# Patient Record
Sex: FEMALE | Race: WHITE | NOT HISPANIC OR LATINO | Employment: OTHER | URBAN - METROPOLITAN AREA
[De-identification: names, ages, dates, MRNs, and addresses within clinical notes are randomized per-mention and may not be internally consistent; named-entity substitution may affect disease eponyms.]

---

## 2018-04-17 ENCOUNTER — OFFICE VISIT (OUTPATIENT)
Dept: URGENT CARE | Facility: MEDICAL CENTER | Age: 58
End: 2018-04-17
Payer: COMMERCIAL

## 2018-04-17 ENCOUNTER — APPOINTMENT (OUTPATIENT)
Dept: RADIOLOGY | Facility: MEDICAL CENTER | Age: 58
End: 2018-04-17
Payer: COMMERCIAL

## 2018-04-17 VITALS
DIASTOLIC BLOOD PRESSURE: 88 MMHG | TEMPERATURE: 98 F | HEIGHT: 63 IN | BODY MASS INDEX: 23.25 KG/M2 | WEIGHT: 131.25 LBS | SYSTOLIC BLOOD PRESSURE: 144 MMHG | HEART RATE: 60 BPM | RESPIRATION RATE: 20 BRPM

## 2018-04-17 DIAGNOSIS — M25.562 ACUTE PAIN OF LEFT KNEE: ICD-10-CM

## 2018-04-17 DIAGNOSIS — M25.562 ACUTE PAIN OF LEFT KNEE: Primary | ICD-10-CM

## 2018-04-17 PROCEDURE — 99283 EMERGENCY DEPT VISIT LOW MDM: CPT | Performed by: PHYSICIAN ASSISTANT

## 2018-04-17 PROCEDURE — G0382 LEV 3 HOSP TYPE B ED VISIT: HCPCS | Performed by: PHYSICIAN ASSISTANT

## 2018-04-17 PROCEDURE — 73564 X-RAY EXAM KNEE 4 OR MORE: CPT

## 2018-04-17 RX ORDER — CETIRIZINE HYDROCHLORIDE 10 MG/1
10 TABLET ORAL DAILY
COMMUNITY
End: 2022-03-14 | Stop reason: CLARIF

## 2018-04-17 RX ORDER — ALBUTEROL SULFATE 2.5 MG/3ML
2.5 SOLUTION RESPIRATORY (INHALATION) EVERY 6 HOURS PRN
COMMUNITY

## 2018-04-17 NOTE — PROGRESS NOTES
Left knee pain for past few weeks and pain getting worse  especially going up and down stairs  Feels a little off balance

## 2018-04-17 NOTE — PROGRESS NOTES
[facility]        NAME: Jing Johnston is a 59 y o  female  : 1960    MRN: [id number]  DATE: 2018  TIME: 3:31 PM    Assessment and Plan   Acute pain of left knee [M25 562]  1  Acute pain of left knee  XR knee 4+ vw left injury         Patient Instructions       Follow up with PCP in 3-5 days  Proceed to  ER if symptoms worsen  Chief Complaint     Chief Complaint   Patient presents with    Knee Pain     for weeks          History of Present Illness       This is a 58 y/o F here for left knee pain x 2 years  Pt reports initially she fell onto her knee while pushing a car up an aluminum ramp in the rain  Pt reports then about a year ago dog ran out of the house and she twisted her knee  Pt reports she has not been seen for her knee pain in past  Pt reports she came in today because the pain has been worsening the past  Couple of days  Pain is located on lateral aspect of knee  Pain is worse when going up and down steps  Pt reports knee giving out sensation when going up and down steps and has to hold on to the railing  Pt denies taking any OTC medications  Knee Pain          Review of Systems   Review of Systems   Constitutional: Negative for chills and fever  HENT: Negative  Eyes: Negative  Respiratory: Negative for chest tightness, shortness of breath and wheezing  Cardiovascular: Negative for chest pain and palpitations  Musculoskeletal: Positive for arthralgias  Negative for back pain and joint swelling  Skin: Negative for rash           Current Medications       Current Outpatient Prescriptions:     albuterol (2 5 mg/3 mL) 0 083 % nebulizer solution, Take 2 5 mg by nebulization every 6 (six) hours as needed for wheezing, Disp: , Rfl:     cetirizine (ZyrTEC) 10 mg tablet, Take 10 mg by mouth daily, Disp: , Rfl:     fluticasone-salmeterol (ADVAIR HFA) 115-21 MCG/ACT inhaler, Inhale 2 puffs 2 (two) times a day, Disp: , Rfl:     Current Allergies     Allergies as of 04/17/2018    (No Known Allergies)            The following portions of the patient's history were reviewed and updated as appropriate: allergies, current medications, past family history, past medical history, past social history, past surgical history and problem list      Past Medical History:   Diagnosis Date    Stroke [facility])        History reviewed  No pertinent surgical history  No family history on file  Medications have been verified  Objective   /88   Pulse 60   Temp 98 °F (36 7 °C) (Temporal)   Resp 20   Ht 5' 3" (1 6 m)   Wt 59 5 kg (131 lb 4 oz)   BMI 23 25 kg/m²     X-RAY  Left knee  I personally reviewed X-ray  No acute osseous abnormalities  Physical Exam     Physical Exam   Constitutional: She is oriented to person, place, and time  She appears well-developed and well-nourished  No distress  Cardiovascular: Normal rate, regular rhythm and normal heart sounds  Pulmonary/Chest: Effort normal and breath sounds normal  No respiratory distress  Musculoskeletal: She exhibits tenderness  Left knee: She exhibits normal range of motion, no swelling, no effusion, no ecchymosis, no deformity, no erythema, no LCL laxity, normal meniscus and no MCL laxity  Tenderness found  Lateral joint line tenderness noted  Negative lachman's test, pain with valgus stress   Neurological: She is alert and oriented to person, place, and time  Skin: No rash noted  Nursing note and vitals reviewed

## 2019-09-30 ENCOUNTER — OFFICE VISIT (OUTPATIENT)
Dept: INTERNAL MEDICINE CLINIC | Age: 59
End: 2019-09-30
Payer: COMMERCIAL

## 2019-09-30 VITALS
DIASTOLIC BLOOD PRESSURE: 78 MMHG | WEIGHT: 126.8 LBS | HEART RATE: 52 BPM | TEMPERATURE: 97.2 F | OXYGEN SATURATION: 98 % | SYSTOLIC BLOOD PRESSURE: 144 MMHG | HEIGHT: 62 IN | BODY MASS INDEX: 23.34 KG/M2

## 2019-09-30 DIAGNOSIS — Z12.39 SCREENING FOR MALIGNANT NEOPLASM OF BREAST: ICD-10-CM

## 2019-09-30 DIAGNOSIS — Z11.59 NEED FOR HEPATITIS C SCREENING TEST: Primary | ICD-10-CM

## 2019-09-30 DIAGNOSIS — Z12.11 SCREENING FOR MALIGNANT NEOPLASM OF COLON: ICD-10-CM

## 2019-09-30 DIAGNOSIS — Z23 NEED FOR INFLUENZA VACCINATION: ICD-10-CM

## 2019-09-30 DIAGNOSIS — J45.909 UNCOMPLICATED ASTHMA, UNSPECIFIED ASTHMA SEVERITY, UNSPECIFIED WHETHER PERSISTENT: ICD-10-CM

## 2019-09-30 DIAGNOSIS — Z13.220 SCREENING CHOLESTEROL LEVEL: ICD-10-CM

## 2019-09-30 DIAGNOSIS — J45.30 MILD PERSISTENT ASTHMA WITHOUT COMPLICATION: ICD-10-CM

## 2019-09-30 DIAGNOSIS — Z12.11 SCREENING FOR COLON CANCER: ICD-10-CM

## 2019-09-30 DIAGNOSIS — R00.1 BRADYCARDIA: ICD-10-CM

## 2019-09-30 PROCEDURE — 90686 IIV4 VACC NO PRSV 0.5 ML IM: CPT | Performed by: INTERNAL MEDICINE

## 2019-09-30 PROCEDURE — 99202 OFFICE O/P NEW SF 15 MIN: CPT | Performed by: INTERNAL MEDICINE

## 2019-09-30 PROCEDURE — 90471 IMMUNIZATION ADMIN: CPT | Performed by: INTERNAL MEDICINE

## 2019-09-30 RX ORDER — VITAMIN B COMPLEX
1 TABLET ORAL DAILY
COMMUNITY

## 2019-09-30 RX ORDER — TRIAMCINOLONE ACETONIDE 55 UG/1
2 SPRAY, METERED NASAL DAILY
Qty: 1 BOTTLE | Refills: 3 | Status: SHIPPED | OUTPATIENT
Start: 2019-09-30 | End: 2020-03-30 | Stop reason: SDUPTHER

## 2019-09-30 RX ORDER — ALBUTEROL SULFATE 90 UG/1
2 AEROSOL, METERED RESPIRATORY (INHALATION) 4 TIMES DAILY
Qty: 3 EACH | Refills: 1 | Status: SHIPPED | OUTPATIENT
Start: 2019-09-30 | End: 2020-03-30 | Stop reason: SDUPTHER

## 2019-09-30 RX ORDER — ALBUTEROL SULFATE 90 UG/1
2 AEROSOL, METERED RESPIRATORY (INHALATION) 4 TIMES DAILY
COMMUNITY
End: 2019-09-30 | Stop reason: SDUPTHER

## 2019-09-30 NOTE — ASSESSMENT & PLAN NOTE
Screening lipid panel  Healthy diet and exercise  Cologuard   Pt not interested in colonoscopy  Recommend screening PAP

## 2019-09-30 NOTE — PROGRESS NOTES
Assessment/Plan:    Mild persistent asthma  Resume advair daily  Use albuterol as needed  Cont zyrtec  Flu shot given today  UTD with pneumovax  Avoid triggers    Screening for colon cancer  Screening lipid panel  Healthy diet and exercise  Cologuard  Pt not interested in colonoscopy  Recommend screening PAP    Bradycardia  Pt states her heart rate has always been low  Asymptomatic  TSH       Diagnoses and all orders for this visit:    Need for hepatitis C screening test  -     Hepatitis C antibody; Future    Screening for malignant neoplasm of breast  -     Mammo screening bilateral w cad; Future    Screening for malignant neoplasm of colon  -     Ambulatory referral to Gastroenterology; Future    Uncomplicated asthma, unspecified asthma severity, unspecified whether persistent  -     albuterol (PROVENTIL HFA,VENTOLIN HFA) 90 mcg/act inhaler; Inhale 2 puffs 4 (four) times a day  -     fluticasone-salmeterol (ADVAIR, WIXELA) 100-50 mcg/dose inhaler; Inhale 1 puff 2 (two) times a day Rinse mouth after use  Screening for colon cancer    Bradycardia    Mild persistent asthma without complication    Other orders  -     fluticasone-salmeterol (ADVAIR, WIXELA) 100-50 mcg/dose inhaler; Inhale 1 puff 2 (two) times a day Rinse mouth after use  -     Vitamin Mixture (VITAMIN E COMPLETE PO); Take by mouth 267 mg  one pill per day  -     Coenzyme Q10 (COQ10) 100 MG CAPS; Take 1 capsule by mouth daily  -     albuterol (PROVENTIL HFA,VENTOLIN HFA) 90 mcg/act inhaler; Inhale 2 puffs 4 (four) times a day          Subjective:          Patient ID: Gerson Prado is a 62 y o  female  She is switching providers and ran out of medication about 1 month ago  She is UTD with pneumovax and Tdap  Discussed flu shot today  Asthma   She complains of frequent throat clearing  There is no chest tightness, cough, difficulty breathing, hemoptysis, hoarse voice, shortness of breath, sputum production or wheezing   This is a chronic problem  The current episode started more than 1 year ago (dx in childhood)  The problem occurs intermittently  The problem has been unchanged  Pertinent negatives include no appetite change, chest pain, dyspnea on exertion, ear congestion, ear pain, fever, headaches, malaise/fatigue, nasal congestion, postnasal drip, rhinorrhea, sneezing or sore throat  Her symptoms are aggravated by animal exposure, change in weather and pollen  Her symptoms are alleviated by steroid inhaler  She reports significant improvement on treatment  Risk factors for lung disease include animal exposure  Her past medical history is significant for asthma  The following portions of the patient's history were reviewed and updated as appropriate: past family history, past surgical history and problem list     Review of Systems   Constitutional: Negative for appetite change, chills, fatigue, fever and malaise/fatigue  HENT: Negative for congestion, ear pain, hoarse voice, postnasal drip, rhinorrhea, sneezing and sore throat  Eyes: Negative for visual disturbance  Respiratory: Negative for cough, hemoptysis, sputum production, shortness of breath and wheezing  Cardiovascular: Negative for chest pain, dyspnea on exertion, palpitations and leg swelling  Gastrointestinal: Negative for abdominal pain, constipation, diarrhea, nausea and vomiting  Neurological: Negative for dizziness and headaches  Psychiatric/Behavioral: Negative for dysphoric mood  The patient is not nervous/anxious            Past Medical History:   Diagnosis Date    Asthma     Stroke [facility])          Current Outpatient Medications:     albuterol (PROVENTIL HFA,VENTOLIN HFA) 90 mcg/act inhaler, Inhale 2 puffs 4 (four) times a day, Disp: , Rfl:     cetirizine (ZyrTEC) 10 mg tablet, Take 10 mg by mouth daily, Disp: , Rfl:     Coenzyme Q10 (COQ10) 100 MG CAPS, Take 1 capsule by mouth daily, Disp: , Rfl:     fluticasone-salmeterol (ADVAIR, WIXELA) 100-50 mcg/dose inhaler, Inhale 1 puff 2 (two) times a day Rinse mouth after use , Disp: , Rfl:     Vitamin Mixture (VITAMIN E COMPLETE PO), Take by mouth 267 mg  one pill per day, Disp: , Rfl:     albuterol (2 5 mg/3 mL) 0 083 % nebulizer solution, Take 2 5 mg by nebulization every 6 (six) hours as needed for wheezing, Disp: , Rfl:     fluticasone-salmeterol (ADVAIR HFA) 115-21 MCG/ACT inhaler, Inhale 2 puffs 2 (two) times a day, Disp: , Rfl:     Allergies   Allergen Reactions    Mayonnaise Hives    Mold Extract [Trichophyton] Other (See Comments)     Asthma      Other Eye Swelling     Dogs, cats, leaves - pt has asthma    Dust Mite Extract Cough     asthma       Social History   History reviewed  No pertinent surgical history  Family History   Problem Relation Age of Onset    Cancer Father     Cancer Brother        Health Maintenance   Topic Date Due    Hepatitis C Screening  1960    MAMMOGRAM  1960    CRC Screening: Colonoscopy  1960    INFLUENZA VACCINE  07/01/2019    Depression Screening PHQ  09/30/2020    BMI: Adult  09/30/2020    Pneumococcal Vaccine: 65+ Years (1 of 2 - PCV13) 05/08/2023    DTaP,Tdap,and Td Vaccines (2 - Td) 04/18/2026    Pneumococcal Vaccine: Pediatrics (0 to 5 Years) and At-Risk Patients (6 to 59 Years)  Aged Out    HEPATITIS B VACCINES  Aged Out       There is no immunization history on file for this patient  Objective:  /78 (BP Location: Right arm, Patient Position: Sitting, Cuff Size: Standard)   Pulse (!) 52   Temp (!) 97 2 °F (36 2 °C) (Tympanic)   Ht 5' 1 81" (1 57 m) Comment: shoes off  Wt 57 5 kg (126 lb 12 8 oz) Comment: shoes off  SpO2 98%   BMI 23 33 kg/m²   Body mass index is 23 33 kg/m²  Physical Exam   Constitutional: She appears well-developed and well-nourished  HENT:   Head: Normocephalic and atraumatic     Right Ear: Tympanic membrane, external ear and ear canal normal    Left Ear: Tympanic membrane, external ear and ear canal normal    Nose: Nose normal    Mouth/Throat: Oropharynx is clear and moist    Eyes: Pupils are equal, round, and reactive to light  Conjunctivae are normal    Neck: Normal range of motion  Neck supple  Cardiovascular: Regular rhythm and normal heart sounds  Mild bradycardia   Pulmonary/Chest: Effort normal and breath sounds normal    Neurological: She is alert  Skin: Skin is warm and dry  Psychiatric: She has a normal mood and affect

## 2019-09-30 NOTE — PATIENT INSTRUCTIONS
Mild persistent asthma  Resume advair daily  Use albuterol as needed  Cont zyrtec  Flu shot given today  UTD with pneumovax  Avoid triggers    Screening for colon cancer  Screening lipid panel  Healthy diet and exercise  Cologuard   Pt not interested in colonoscopy  Recommend screening PAP    Bradycardia  Pt states her heart rate has always been low  Asymptomatic  TSH

## 2019-09-30 NOTE — ASSESSMENT & PLAN NOTE
Resume advair daily    Use albuterol as needed  Cont zyrtec  Flu shot given today  UTD with pneumovax  Avoid triggers

## 2020-03-30 ENCOUNTER — TELEMEDICINE (OUTPATIENT)
Dept: INTERNAL MEDICINE CLINIC | Age: 60
End: 2020-03-30
Payer: COMMERCIAL

## 2020-03-30 DIAGNOSIS — Z12.11 SCREENING FOR COLON CANCER: Primary | ICD-10-CM

## 2020-03-30 DIAGNOSIS — J45.909 UNCOMPLICATED ASTHMA, UNSPECIFIED ASTHMA SEVERITY, UNSPECIFIED WHETHER PERSISTENT: ICD-10-CM

## 2020-03-30 DIAGNOSIS — J45.30 MILD PERSISTENT ASTHMA WITHOUT COMPLICATION: ICD-10-CM

## 2020-03-30 PROCEDURE — G2012 BRIEF CHECK IN BY MD/QHP: HCPCS | Performed by: INTERNAL MEDICINE

## 2020-03-30 RX ORDER — TRIAMCINOLONE ACETONIDE 55 UG/1
2 SPRAY, METERED NASAL DAILY
Qty: 1 BOTTLE | Refills: 3 | Status: SHIPPED | OUTPATIENT
Start: 2020-03-30 | End: 2022-03-14 | Stop reason: ALTCHOICE

## 2020-03-30 RX ORDER — ALBUTEROL SULFATE 90 UG/1
2 AEROSOL, METERED RESPIRATORY (INHALATION) 4 TIMES DAILY
Qty: 3 EACH | Refills: 1 | Status: SHIPPED | OUTPATIENT
Start: 2020-03-30 | End: 2020-09-21 | Stop reason: SDUPTHER

## 2020-03-30 NOTE — ASSESSMENT & PLAN NOTE
Worsening control due to spring allergies  Increase advair to 250/50 BID during the spring  May decrease to 100/50 in the summer if she is feeling better  Cont albuterol as needed  Cont zyrtec and triamcinolone nasal spray  Try to avoid allergens  COVID-19 social distancing precautions encouraged and reviewed  Pt will call if any change in symptoms  f/u in 6 months

## 2020-03-30 NOTE — PROGRESS NOTES
Virtual Brief Visit    Problem List Items Addressed This Visit        Respiratory    Mild persistent asthma     Worsening control due to spring allergies  Increase advair to 250/50 BID during the spring  May decrease to 100/50 in the summer if she is feeling better  Cont albuterol as needed  Cont zyrtec and triamcinolone nasal spray  Try to avoid allergens  COVID-19 social distancing precautions encouraged and reviewed  Pt will call if any change in symptoms  f/u in 6 months             Relevant Medications    Triamcinolone Acetonide 55 MCG/ACT AERO    albuterol (PROVENTIL HFA,VENTOLIN HFA) 90 mcg/act inhaler    fluticasone-salmeterol (ADVAIR, WIXELA) 250-50 mcg/dose inhaler       Other    Screening for colon cancer - Primary     discussed cologuard  Rx sent         Relevant Orders    Cologuard      Other Visit Diagnoses     Uncomplicated asthma, unspecified asthma severity, unspecified whether persistent        Relevant Medications    albuterol (PROVENTIL HFA,VENTOLIN HFA) 90 mcg/act inhaler    fluticasone-salmeterol (ADVAIR, [place] 250-50 mcg/dose inhaler                Reason for visit is follow up of mild persistent asthma  Encounter provider Lillie Aguillon PA-C    Provider located at [address] [place] [address]      Recent Visits  No visits were found meeting these conditions  Showing recent visits within past 7 days and meeting all other requirements     Today's Visits  Date Type Provider Dept   03/30/20 [facility]   Showing today's visits and meeting all other requirements     Future Appointments  No visits were found meeting these conditions  Showing future appointments within next 150 days and meeting all other requirements        After connecting through telephone and patient was informed that this is not a secure, HIPAA-complaint platform   she agrees to proceed  , the patient was identified by name and date of birth  Jennifer Peralta was informed that this is a telemedicine visit and that the visit is being conducted through telephone and patient was informed that this is not a secure, HIPAA-complaint platform  she agrees to proceed     My office door was closed  No one else was in the room  She acknowledged consent and understanding of privacy and security of the video platform  The patient has agreed to participate and understands they can discontinue the visit at any time  Patient is aware this is a billable service  Subjective  Jennifer Peralta is a 62 y o  female with PMH of mild persistent asthma  She feels she is doing well but does need refills  The spring is the worst time for her asthma  She is using advair BID and nasal steroid along with zyrtec daily  She is using albuterol BID lately due to increased seasonal allergies  She has increased PND and rhinorrhea  She gets relief once she uses her inhaler  She is needing to wash her dog 2 times per week and that is a trigger for her and needs to use albuterol when she does that  She is trying to exercise more  She is concerned about COVID-19 exposure as she is still working  She works as a  but is outdoors and is able to work independently  She is also taking care of her elderly her mother  Past Medical History:   Diagnosis Date    Asthma     Stroke [facility])        History reviewed  No pertinent surgical history      Current Outpatient Medications   Medication Sig Dispense Refill    albuterol (2 5 mg/3 mL) 0 083 % nebulizer solution Take 2 5 mg by nebulization every 6 (six) hours as needed for wheezing      cetirizine (ZyrTEC) 10 mg tablet Take 10 mg by mouth daily      Coenzyme Q10 (COQ10) 100 MG CAPS Take 1 capsule by mouth daily      Vitamin Mixture (VITAMIN E COMPLETE PO) Take by mouth 267 mg  one pill per day      albuterol (PROVENTIL HFA,VENTOLIN HFA) 90 mcg/act inhaler Inhale 2 puffs 4 (four) times a day 3 each 1    fluticasone-salmeterol (ADVAIR, WIXELA) 250-50 mcg/dose inhaler Inhale 1 puff 2 (two) times a day Rinse mouth after use  1 Inhaler 5    Triamcinolone Acetonide 55 MCG/ACT AERO 2 Act (110 mcg total) into each nostril daily 1 Bottle 3     No current facility-administered medications for this visit  Allergies   Allergen Reactions    Mayonnaise Hives    Mold Extract [Trichophyton] Other (See Comments)     Asthma      Other Eye Swelling     Dogs, cats, leaves - pt has asthma    Dust Mite Extract Cough     asthma       Review of Systems   Constitutional: Negative for chills, fatigue and fever  HENT: Positive for postnasal drip and rhinorrhea  Negative for congestion and sore throat  Eyes: Negative for visual disturbance  Respiratory: Negative for cough and shortness of breath  Cardiovascular: Negative for chest pain, palpitations and leg swelling  Gastrointestinal: Negative for abdominal pain, constipation, diarrhea, nausea and vomiting  Neurological: Negative for dizziness and headaches  Psychiatric/Behavioral: Negative for dysphoric mood  The patient is not nervous/anxious        I spent 15 minutes with the patient during this visit

## 2020-09-21 ENCOUNTER — OFFICE VISIT (OUTPATIENT)
Dept: INTERNAL MEDICINE CLINIC | Age: 60
End: 2020-09-21
Payer: COMMERCIAL

## 2020-09-21 VITALS
SYSTOLIC BLOOD PRESSURE: 140 MMHG | OXYGEN SATURATION: 98 % | BODY MASS INDEX: 22.15 KG/M2 | DIASTOLIC BLOOD PRESSURE: 84 MMHG | HEIGHT: 63 IN | WEIGHT: 125 LBS | TEMPERATURE: 97.3 F | HEART RATE: 60 BPM

## 2020-09-21 DIAGNOSIS — Z12.11 SCREENING FOR COLON CANCER: ICD-10-CM

## 2020-09-21 DIAGNOSIS — J45.909 UNCOMPLICATED ASTHMA, UNSPECIFIED ASTHMA SEVERITY, UNSPECIFIED WHETHER PERSISTENT: ICD-10-CM

## 2020-09-21 DIAGNOSIS — Z11.59 ENCOUNTER FOR HEPATITIS C SCREENING TEST FOR LOW RISK PATIENT: ICD-10-CM

## 2020-09-21 DIAGNOSIS — J45.30 MILD PERSISTENT ASTHMA WITHOUT COMPLICATION: Primary | ICD-10-CM

## 2020-09-21 DIAGNOSIS — I10 ESSENTIAL HYPERTENSION: ICD-10-CM

## 2020-09-21 PROBLEM — Z71.85 IMMUNIZATION COUNSELING: Status: ACTIVE | Noted: 2020-09-21

## 2020-09-21 PROBLEM — S93.401A RIGHT ANKLE SPRAIN: Status: ACTIVE | Noted: 2020-09-21

## 2020-09-21 PROCEDURE — 99214 OFFICE O/P EST MOD 30 MIN: CPT | Performed by: INTERNAL MEDICINE

## 2020-09-21 RX ORDER — ALBUTEROL SULFATE 90 UG/1
2 AEROSOL, METERED RESPIRATORY (INHALATION) 4 TIMES DAILY
Qty: 3 EACH | Refills: 1 | Status: SHIPPED | OUTPATIENT
Start: 2020-09-21 | End: 2021-03-15 | Stop reason: SDUPTHER

## 2020-09-21 RX ORDER — MELATONIN
1000 DAILY
COMMUNITY

## 2020-09-21 NOTE — ASSESSMENT & PLAN NOTE
Well controlled  Cont advair, zyrtec  Albuterol as needed  Recommend annual flu shot  F/u in 6 months

## 2020-09-21 NOTE — ASSESSMENT & PLAN NOTE
Patient gets very nervous coming into the office  She will start recording BP at home and call with readings in 1 month  Low salt diet  She eats a very healthy diet and does not consume caffeine  She exercises regularly and is very active      Will wait on BP medications for now

## 2020-09-21 NOTE — ASSESSMENT & PLAN NOTE
Now resolved  No ligament laxity  Able to ambulate without difficulty  No treatment indicated at this point

## 2020-09-21 NOTE — PATIENT INSTRUCTIONS
Essential hypertension  Patient gets very nervous coming into the office  She will start recording BP at home and call with readings in 1 month  Low salt diet  She eats a very healthy diet and does not consume caffeine  She exercises regularly and is very active      Will wait on BP medications for now    Mild persistent asthma  Well controlled  Cont advair, zyrtec  Albuterol as needed  Recommend annual flu shot  F/u in 6 months    Immunization counseling  Discussed annual flu shot and shingrix  UTD with pneumococcal and Tdap    Screening for colon cancer  Discussed screening  Pt zate in [facility]

## 2020-09-21 NOTE — PROGRESS NOTES
Assessment/Plan:    Essential hypertension  Patient gets very nervous coming into the office  She will start recording BP at home and call with readings in 1 month  Low salt diet  She eats a very healthy diet and does not consume caffeine  She exercises regularly and is very active  Will wait on BP medications for now    Mild persistent asthma  Well controlled  Cont advair, zyrtec  Albuterol as needed  Recommend annual flu shot  F/u in 6 months    Immunization counseling  Discussed annual flu shot and shingrix  UTD with pneumococcal and Tdap    Screening for colon cancer  Discussed screening  Pt interste in [facility]    Right ankle sprain  Now resolved  No ligament laxity  Able to ambulate without difficulty  No treatment indicated at this point       Diagnoses and all orders for this visit:    Mild persistent asthma without complication  -     fluticasone-salmeterol (ADVAIR, [address]) 250-50 mcg/dose inhaler; Inhale 1 puff 2 (two) times a day Rinse mouth after use  Essential hypertension  -     Lipid panel; Future  -     Comprehensive metabolic panel; Future  -     CBC and differential; Future  -     TSH, 3rd generation with Free T4 reflex; Future    Screening for colon cancer    Encounter for hepatitis C screening test for low risk patient  -     Hepatitis C antibody; Future    Uncomplicated asthma, unspecified asthma severity, unspecified whether persistent  -     albuterol (PROVENTIL HFA,VENTOLIN HFA) 90 mcg/act inhaler; Inhale 2 puffs 4 (four) times a day    Other orders  -     cholecalciferol (VITAMIN D3) 1,000 units tablet; Take 1,000 Units by mouth daily  -     ELDERBERRY PO; Take by mouth               Subjective:          Patient ID: Milena Herrera is a 56 y o  female  Ankle Injury    The incident occurred more than 1 week ago (3 months ago)  The incident occurred at work (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)  The injury mechanism was an eversion injury   The pain is present in the right ankle  The pain is at a severity of 0/10  The patient is experiencing no pain  The pain has been improving since onset  Pertinent negatives include no inability to bear weight, loss of motion, loss of sensation, numbness or tingling  Associated symptoms comments: initially she had significant swelling and bruising but symptoms have now resolved  Symptoms lasted a "few months" before it resolved  She "just wants my ankle checked" today  She reports no foreign bodies present  Nothing aggravates the symptoms  She has tried rest and ice for the symptoms  The treatment provided significant relief  Asthma   There is no chest tightness, cough, difficulty breathing, frequent throat clearing, hemoptysis, shortness of breath, sputum production or wheezing  This is a chronic problem  The current episode started more than 1 year ago  The problem occurs intermittently (she feels her ashtma "is really well controlled")  The problem has been unchanged  Pertinent negatives include no appetite change, chest pain, dyspnea on exertion, ear congestion, ear pain, fever, headaches, malaise/fatigue, nasal congestion, postnasal drip, rhinorrhea, sneezing, sore throat or trouble swallowing  Her symptoms are aggravated by pollen and URI  Her symptoms are alleviated by beta-agonist and steroid inhaler (zyrtec, flonase)  She reports significant improvement on treatment  Her past medical history is significant for asthma  The following portions of the patient's history were reviewed and updated as appropriate: past family history, past surgical history and problem list     Review of Systems   Constitutional: Negative for appetite change, chills, fatigue, fever and malaise/fatigue  HENT: Negative for congestion, ear pain, postnasal drip, rhinorrhea, sneezing, sore throat and trouble swallowing  Eyes: Negative for visual disturbance     Respiratory: Negative for cough, hemoptysis, sputum production, shortness of breath and wheezing  Cardiovascular: Negative for chest pain, dyspnea on exertion, palpitations and leg swelling  Gastrointestinal: Negative for abdominal pain, constipation, diarrhea, nausea and vomiting  Neurological: Negative for dizziness, tingling, numbness and headaches  Psychiatric/Behavioral: Negative for dysphoric mood  The patient is not nervous/anxious  Past Medical History:   Diagnosis Date    Asthma     Stroke [facility])          Current Outpatient Medications:     albuterol (PROVENTIL HFA,VENTOLIN HFA) 90 mcg/act inhaler, Inhale 2 puffs 4 (four) times a day, Disp: 3 each, Rfl: 1    cetirizine (ZyrTEC) 10 mg tablet, Take 10 mg by mouth daily, Disp: , Rfl:     cholecalciferol (VITAMIN D3) 1,000 units tablet, Take 1,000 Units by mouth daily, Disp: , Rfl:     Coenzyme Q10 (COQ10) 100 MG CAPS, Take 1 capsule by mouth daily, Disp: , Rfl:     ELDERBERRY PO, Take by mouth, Disp: , Rfl:     fluticasone-salmeterol (ADVAIR, WIXELA) 250-50 mcg/dose inhaler, Inhale 1 puff 2 (two) times a day Rinse mouth after use , Disp: 1 Inhaler, Rfl: 5    Triamcinolone Acetonide 55 MCG/ACT AERO, 2 Act (110 mcg total) into each nostril daily, Disp: 1 Bottle, Rfl: 3    Vitamin Mixture (VITAMIN E COMPLETE PO), Take by mouth 267 mg  one pill per day, Disp: , Rfl:     albuterol (2 5 mg/3 mL) 0 083 % nebulizer solution, Take 2 5 mg by nebulization every 6 (six) hours as needed for wheezing, Disp: , Rfl:     Allergies   Allergen Reactions    [facility]    Mold Extract [Trichophyton] Other (See Comments)     Asthma      Other Eye Swelling     Dogs, cats, leaves - pt has asthma    Dust Mite Extract Cough     asthma       Social History   History reviewed  No pertinent surgical history    Family History   Problem Relation Age of Onset    Cancer Father     Cancer Brother        Health Maintenance   Topic Date Due    Hepatitis C Screening  1960    MAMMOGRAM  1960    HIV Screening  05/08/1973    Annual Physical  05/08/1976    Cervical Cancer Screening  05/08/1979    Colorectal Cancer Screening  05/08/2008    Influenza Vaccine  07/01/2020    Depression Screening PHQ  09/30/2020    BMI: Adult  09/21/2021    DTaP,Tdap,and Td Vaccines (2 - Td) 04/18/2026    Pneumococcal Vaccine: Pediatrics (0 to 5 Years) and At-Risk Patients (6 to 59 Years)  Completed    HIB Vaccine  Aged Out    Hepatitis B Vaccine  Aged Out    IPV Vaccine  Aged Out    Hepatitis A Vaccine  Aged Out    Meningococcal ACWY Vaccine  Aged Out    HPV Vaccine  Aged [employer] History   Administered Date(s) Administered    INFLUENZA 08/19/2014, 08/12/2017, 12/06/2018    Influenza, injectable, quadrivalent, preservative free 0 5 mL 09/30/2019    Pneumococcal Polysaccharide PPV23 04/18/2016    Tdap 04/18/2016         Objective:  /84   Pulse 60   Temp (!) 97 3 °F (36 3 °C) (Temporal)   Ht 5' 3" (1 6 m)   Wt 56 7 kg (125 lb)   SpO2 98%   BMI 22 14 kg/m²   Body mass index is 22 14 kg/m²  Physical Exam  Constitutional:       Appearance: She is well-developed  HENT:      Head: Normocephalic and atraumatic  Right Ear: Tympanic membrane, ear canal and external ear normal       Left Ear: Tympanic membrane, ear canal and external ear normal       Nose: Nose normal    Eyes:      Conjunctiva/sclera: Conjunctivae normal       Pupils: Pupils are equal, round, and reactive to light  Neck:      Musculoskeletal: Normal range of motion and neck supple  Cardiovascular:      Rate and Rhythm: Normal rate and regular rhythm  Heart sounds: Normal heart sounds  Pulmonary:      Effort: Pulmonary effort is normal       Breath sounds: Normal breath sounds  Musculoskeletal:      Comments: Bilateral ankles examined  No swelling or bruising  FROM  Right ankle negative anterior drawer, varus/valgus laxity  Skin:     General: Skin is warm and dry  Neurological:      Mental Status: She is alert

## 2021-03-15 ENCOUNTER — OFFICE VISIT (OUTPATIENT)
Dept: INTERNAL MEDICINE CLINIC | Age: 61
End: 2021-03-15
Payer: COMMERCIAL

## 2021-03-15 VITALS
DIASTOLIC BLOOD PRESSURE: 78 MMHG | HEIGHT: 63 IN | OXYGEN SATURATION: 97 % | SYSTOLIC BLOOD PRESSURE: 134 MMHG | WEIGHT: 134.8 LBS | HEART RATE: 60 BPM | TEMPERATURE: 97.8 F | BODY MASS INDEX: 23.88 KG/M2

## 2021-03-15 DIAGNOSIS — Z12.31 ENCOUNTER FOR SCREENING MAMMOGRAM FOR MALIGNANT NEOPLASM OF BREAST: Primary | ICD-10-CM

## 2021-03-15 DIAGNOSIS — I10 ESSENTIAL HYPERTENSION: ICD-10-CM

## 2021-03-15 DIAGNOSIS — R19.5 POSITIVE COLORECTAL CANCER SCREENING USING COLOGUARD TEST: ICD-10-CM

## 2021-03-15 DIAGNOSIS — Z11.59 ENCOUNTER FOR HEPATITIS C SCREENING TEST FOR LOW RISK PATIENT: ICD-10-CM

## 2021-03-15 DIAGNOSIS — J45.30 MILD PERSISTENT ASTHMA WITHOUT COMPLICATION: ICD-10-CM

## 2021-03-15 DIAGNOSIS — J45.909 UNCOMPLICATED ASTHMA, UNSPECIFIED ASTHMA SEVERITY, UNSPECIFIED WHETHER PERSISTENT: ICD-10-CM

## 2021-03-15 DIAGNOSIS — Z71.85 IMMUNIZATION COUNSELING: ICD-10-CM

## 2021-03-15 PROCEDURE — 99214 OFFICE O/P EST MOD 30 MIN: CPT | Performed by: INTERNAL MEDICINE

## 2021-03-15 RX ORDER — ALBUTEROL SULFATE 90 UG/1
2 AEROSOL, METERED RESPIRATORY (INHALATION) 4 TIMES DAILY
Qty: 3 EACH | Refills: 1 | Status: SHIPPED | OUTPATIENT
Start: 2021-03-15 | End: 2021-12-20 | Stop reason: SDUPTHER

## 2021-03-15 NOTE — ASSESSMENT & PLAN NOTE
Stable  Cont advair 250/50 1 puff BID and zyrtec daily  Albuterol prn  Avoid triggers  Call if increase in symptom frequency or severity  F/u in 6 months

## 2021-03-15 NOTE — PROGRESS NOTES
Assessment/Plan:    Positive colorectal cancer screening using Cologuard test  colonoscopy  Reviewed findings and strongly recommended colonoscopy follow up  Stressed importance of following a positive screen and implications    Immunization counseling  UTD with annual flu, pneumovax, Tdap  Discussed shingrix and COVID    Mild persistent asthma  Stable  Cont advair 250/50 1 puff BID and zyrtec daily  Albuterol prn  Avoid triggers  Call if increase in symptom frequency or severity  F/u in 6 months    Essential hypertension  Very mild  Exercise, low salt diet  Decrease black licorice  CBC, CMP, lipid panel, TSH  F/u in 3 months       Diagnoses and all orders for this visit:    Encounter for screening mammogram for malignant neoplasm of breast  -     Mammo screening bilateral w 3d & cad; Future    Mild persistent asthma without complication  -     fluticasone-salmeterol (ADVAIR, WIXELA) 250-50 mcg/dose inhaler; Inhale 1 puff 2 (two) times a day Rinse mouth after use  Uncomplicated asthma, unspecified asthma severity, unspecified whether persistent  -     albuterol (PROVENTIL HFA,VENTOLIN HFA) 90 mcg/act inhaler; Inhale 2 puffs 4 (four) times a day    Positive colorectal cancer screening using Cologuard test  -     Ambulatory referral for colonoscopy; Future    Immunization counseling    Essential hypertension  -     Lipid panel; Future  -     CBC and differential; Future  -     Comprehensive metabolic panel; Future  -     TSH, 3rd generation with Free T4 reflex; Future    Encounter for hepatitis C screening test for low risk patient  -     Hepatitis C antibody; Future    Other orders  -     Ascorbic Acid (VITAMIN C GUMMIE PO); Take 1 tablet by mouth daily               Subjective:          Patient ID: Talya Gutierrez is a 56 y o  female  Asthma:  She is stable  She uses her albuterol when she has occupational exposure  She has chest tightness with pollen and dust exposure    She is taking zyrtec regularly as well      Cologuard screen was positive  She has hemorrhoids that occurred during child birth and has not resolved  She has intermittent bleeding with "picking up things that are too heavy for me"  No blood with BM  No constipation, diarrhea, abd pain, weight loss  She has never had a colonoscopy  No family history of colon cancer or bowel issues  No rectal pain  Asthma  There is no chest tightness, cough, difficulty breathing, hemoptysis, shortness of breath, sputum production or wheezing  This is a recurrent problem  The current episode started more than 1 year ago  The problem occurs intermittently  The problem has been unchanged  Associated symptoms include nasal congestion, postnasal drip, rhinorrhea and sneezing  Pertinent negatives include no appetite change, chest pain, ear congestion, ear pain, fever, headaches, sore throat or weight loss  Her symptoms are aggravated by pollen and occupational exposure  Her symptoms are alleviated by steroid inhaler and beta-agonist  She reports significant improvement on treatment  Risk factors for lung disease include occupational exposure  Her past medical history is significant for asthma  The following portions of the patient's history were reviewed and updated as appropriate: past family history, past surgical history and problem list     Review of Systems   Constitutional: Positive for fatigue  Negative for appetite change, chills, fever, unexpected weight change and weight loss  HENT: Positive for postnasal drip, rhinorrhea and sneezing  Negative for congestion, ear pain and sore throat  Eyes: Negative for visual disturbance  Respiratory: Negative for cough, hemoptysis, sputum production, shortness of breath and wheezing  Cardiovascular: Negative for chest pain, palpitations and leg swelling  Gastrointestinal: Negative for abdominal pain, constipation, diarrhea, nausea and vomiting  Neurological: Negative for dizziness and headaches  Psychiatric/Behavioral: Negative for dysphoric mood  The patient is not nervous/anxious  Past Medical History:   Diagnosis Date    Asthma     Stroke [facility])          Current Outpatient Medications:     albuterol (2 5 mg/3 mL) 0 083 % nebulizer solution, Take 2 5 mg by nebulization every 6 (six) hours as needed for wheezing, Disp: , Rfl:     albuterol (PROVENTIL HFA,VENTOLIN HFA) 90 mcg/act inhaler, Inhale 2 puffs 4 (four) times a day, Disp: 3 each, Rfl: 1    Ascorbic Acid (VITAMIN C GUMMIE PO), Take 1 tablet by mouth daily, Disp: , Rfl:     cetirizine (ZyrTEC) 10 mg tablet, Take 10 mg by mouth daily, Disp: , Rfl:     cholecalciferol (VITAMIN D3) 1,000 units tablet, Take 1,000 Units by mouth daily, Disp: , Rfl:     Coenzyme Q10 (COQ10) 100 MG CAPS, Take 1 capsule by mouth daily, Disp: , Rfl:     ELDERBERRY PO, Take 1 capsule by mouth daily , Disp: , Rfl:     fluticasone-salmeterol (ADVAIR, WIXELA) 250-50 mcg/dose inhaler, Inhale 1 puff 2 (two) times a day Rinse mouth after use , Disp: 1 Inhaler, Rfl: 5    Triamcinolone Acetonide 55 MCG/ACT AERO, 2 Act (110 mcg total) into each nostril daily, Disp: 1 Bottle, Rfl: 3    Vitamin Mixture (VITAMIN E COMPLETE PO), Take by mouth 267 mg  one pill per day, Disp: , Rfl:     Allergies   Allergen Reactions    Mayonnaise Hives    Mold Extract [Trichophyton] Other (See Comments)     Asthma      Other Eye Swelling     Dogs, cats, leaves - pt has asthma    Dust Mite Extract Cough     asthma       Social History   History reviewed  No pertinent surgical history    Family History   Problem Relation Age of Onset    Cancer Father     Cancer Brother        Health Maintenance   Topic Date Due    Hepatitis C Screening  1960    MAMMOGRAM  1960    HIV Screening  05/08/1973    Annual Physical  05/08/1976    Cervical Cancer Screening  05/08/1979    Colorectal Cancer Screening  05/08/2008    Influenza Vaccine (1) 09/01/2020    Depression Screening PHQ  09/21/2021    BMI: Adult  09/21/2021    DTaP,Tdap,and Td Vaccines (2 - Td) 04/18/2026    Pneumococcal Vaccine: Pediatrics (0 to 5 Years) and At-Risk Patients (6 to 59 Years)  Completed    HIB Vaccine  Aged Out    Hepatitis B Vaccine  Aged Out    IPV Vaccine  Aged Out    Hepatitis A Vaccine  Aged Out    Meningococcal ACWY Vaccine  Aged Out    HPV Vaccine  Aged [employer] History   Administered Date(s) Administered    INFLUENZA 08/19/2014, 08/12/2017, 12/06/2018    Influenza, injectable, quadrivalent, preservative free 0 5 mL 09/30/2019    Pneumococcal Polysaccharide PPV23 04/18/2016    Tdap 04/18/2016         Objective:  /78   Pulse 60   Temp 97 8 °F (36 6 °C) (Temporal)   Ht 5' 3" (1 6 m)   Wt 61 1 kg (134 lb 12 8 oz)   SpO2 97%   BMI 23 88 kg/m²   Body mass index is 23 88 kg/m²  Physical Exam  Constitutional:       Appearance: She is well-developed  HENT:      Head: Normocephalic and atraumatic  Right Ear: Tympanic membrane, ear canal and external ear normal       Left Ear: Tympanic membrane, ear canal and external ear normal       Nose: Nose normal    Eyes:      Conjunctiva/sclera: Conjunctivae normal       Pupils: Pupils are equal, round, and reactive to light  Neck:      Musculoskeletal: Normal range of motion and neck supple  Cardiovascular:      Rate and Rhythm: Normal rate and regular rhythm  Heart sounds: Normal heart sounds  Pulmonary:      Effort: Pulmonary effort is normal       Breath sounds: Normal breath sounds  Abdominal:      General: Bowel sounds are normal       Palpations: Abdomen is soft  Skin:     General: Skin is warm and dry  Neurological:      Mental Status: She is alert

## 2021-03-15 NOTE — ASSESSMENT & PLAN NOTE
colonoscopy  Reviewed findings and strongly recommended colonoscopy follow up    Stressed importance of following a positive screen and implications

## 2021-04-06 DIAGNOSIS — J45.30 MILD PERSISTENT ASTHMA WITHOUT COMPLICATION: ICD-10-CM

## 2021-04-06 RX ORDER — TRIAMCINOLONE ACETONIDE 55 UG/1
2 SPRAY, METERED NASAL DAILY
Refills: 3 | OUTPATIENT
Start: 2021-04-06

## 2021-04-21 ENCOUNTER — TELEPHONE (OUTPATIENT)
Dept: GASTROENTEROLOGY | Facility: CLINIC | Age: 61
End: 2021-04-21

## 2021-04-21 DIAGNOSIS — Z12.11 SPECIAL SCREENING FOR MALIGNANT NEOPLASMS, COLON: Primary | ICD-10-CM

## 2021-04-21 RX ORDER — SODIUM, POTASSIUM,MAG SULFATES 17.5-3.13G
SOLUTION, RECONSTITUTED, ORAL ORAL
Qty: 2 BOTTLE | Refills: 0 | Status: SHIPPED | OUTPATIENT
Start: 2021-04-21 | End: 2021-07-07 | Stop reason: HOSPADM

## 2021-04-21 NOTE — TELEPHONE ENCOUNTER
Passed oa  Colonoscopy scheduled 07/07/21 @[place] with Dr Juani Suh instructions given to pt verbally/mailed  04/21/21  Screened by: Guilherme Alfaro    Referring Provider Cristhian Diaz    Pre- Screening:     BMI  23 88    Has patient been referred for a routine screening Colonoscopy? yes  Is the patient between 39-70 years old? yes      Previous Colonoscopy no   If yes:    Date:     Facility:     Reason:       SCHEDULING STAFF: If the patient is between 45yrs-49yrs, please advise patient to confirm benefits/coverage with their insurance company for a routine screening colonoscopy, some insurance carriers will only cover at [id number] or older  If the patient is over 66years old, please schedule an office visit  Does the patient want to see a Gastroenterologist prior to their procedure OR are they having any GI symptoms? no    Has the patient been hospitalized or had abdominal surgery in the past 6 months? no    Does the patient use supplemental oxygen? no    Does the patient take Coumadin, Lovenox, Plavix, Elliquis, Xarelto, or other blood thinning medication? no    Has the patient had a stroke, cardiac event, or stent placed in the past year? no    SCHEDULING STAFF: If patient answers NO to above questions, then schedule procedure  If patient answers YES to above questions, then schedule office appointment  If patient is between 45yrs - 49yrs, please advise patient that we will have to confirm benefits & coverage with their insurance company for a routine screening colonoscopy

## 2021-06-21 ENCOUNTER — OFFICE VISIT (OUTPATIENT)
Dept: INTERNAL MEDICINE CLINIC | Age: 61
End: 2021-06-21
Payer: COMMERCIAL

## 2021-06-21 VITALS
HEIGHT: 63 IN | BODY MASS INDEX: 23.14 KG/M2 | DIASTOLIC BLOOD PRESSURE: 78 MMHG | WEIGHT: 130.6 LBS | OXYGEN SATURATION: 97 % | HEART RATE: 55 BPM | SYSTOLIC BLOOD PRESSURE: 132 MMHG | TEMPERATURE: 97.5 F
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
## 2021-06-21 DIAGNOSIS — L23.7 POISON IVY DERMATITIS: ICD-10-CM

## 2021-06-21 DIAGNOSIS — I10 ESSENTIAL HYPERTENSION: ICD-10-CM

## 2021-06-21 DIAGNOSIS — R19.5 POSITIVE COLORECTAL CANCER SCREENING USING COLOGUARD TEST: ICD-10-CM

## 2021-06-21 DIAGNOSIS — L24.7 IRRITANT CONTACT DERMATITIS DUE TO PLANTS, EXCEPT FOOD: Primary | ICD-10-CM

## 2021-06-21 PROCEDURE — 99214 OFFICE O/P EST MOD 30 MIN: CPT | Performed by: INTERNAL MEDICINE

## 2021-06-21 PROCEDURE — 3078F DIAST BP <80 MM HG: CPT | Performed by: INTERNAL MEDICINE

## 2021-06-21 PROCEDURE — 3008F BODY MASS INDEX DOCD: CPT | Performed by: INTERNAL MEDICINE

## 2021-06-21 PROCEDURE — 3075F SYST BP GE 130 - 139MM HG: CPT | Performed by: INTERNAL MEDICINE

## 2021-06-21 PROCEDURE — 3725F SCREEN DEPRESSION PERFORMED: CPT | Performed by: INTERNAL MEDICINE

## 2021-06-21 PROCEDURE — 1036F TOBACCO NON-USER: CPT | Performed by: INTERNAL MEDICINE

## 2021-06-21 RX ORDER — PREDNISONE 10 MG/1
TABLET ORAL
Qty: 30 TABLET | Refills: 1 | Status: SHIPPED | OUTPATIENT
Start: 2021-06-21 | End: 2021-07-07

## 2021-06-21 NOTE — ASSESSMENT & PLAN NOTE
Recurrent poison ivy  rx for prednsione taper when it is bad  Reviewed washing equiptment with something that cuts grease  Suggested showering daily with Tech-nu, catrachita dish soap, or fels-naptha  Reaction is to oil on skin, wash with cold water frequently

## 2021-06-21 NOTE — PROGRESS NOTES
Assessment/Plan:    Essential hypertension  Improved  Decrease stress  Limit salt in diet, decrease caffeine  Pt has rx for labs  Moniotr BP at home, call if > 140/90 consistently  F/u in 6 months    Positive colorectal cancer screening using Cologuard test  Has colonoscopy schedule for 7/7/21    Tick bites  Lyme serology    Poison ivy dermatitis  Recurrent poison ivy  rx for prednsione taper when it is bad  Reviewed washing equiptment with something that cuts grease  Suggested showering daily with Tech-nu, catrachita dish soap, or fels-naptha  Reaction is to oil on skin, wash with cold water frequently       Diagnoses and all orders for this visit:    Irritant contact dermatitis due to plants, except food  -     predniSONE 10 mg tablet; 40 mg x 3 days 30 mg x 3 days 20 mg x 3 days 10 mg x 3 days    Tick bite, initial encounter  -     Lyme Antibody Profile with reflex to WB; Future    Essential hypertension    Positive colorectal cancer screening using Cologuard test    Poison ivy dermatitis               Subjective:          Patient ID: Kristal Barrow is a 59 y o  female     +cologuard:  Pt has a colonoscopy scheduled on 7/7/21  Poison ivy:  She is a  and is "always covered in poison ivy"  She has a very itchy rash over arms/legs  She washes with an OTC wash but is not using anything for the rash  She is very active  She eats a healthy diet and does not add salt to food  Limited process foods  She does not drink any caffeine  She is not eating a lot of black licorice  Asthma has been pretty well controlled  She will have occasional flares when she is exposed to mold  Tick bites:  She is a  and has had numerous tick bites this season  Several ticks were embedded  Hypertension  This is a recurrent problem  The problem has been gradually improving since onset   Pertinent negatives include no anxiety, blurred vision, chest pain, headaches, malaise/fatigue, palpitations, peripheral edema or shortness of breath  Risk factors for coronary artery disease include post-menopausal state  The following portions of the patient's history were reviewed and updated as appropriate: past family history, past surgical history and problem list     Review of Systems   Constitutional: Negative for chills, fatigue, fever and malaise/fatigue  HENT: Negative for congestion and sore throat  Eyes: Negative for blurred vision and visual disturbance  Respiratory: Negative for cough and shortness of breath  Cardiovascular: Negative for chest pain, palpitations and leg swelling  Gastrointestinal: Negative for abdominal pain, constipation, diarrhea, nausea and vomiting  Neurological: Negative for dizziness and headaches  Psychiatric/Behavioral: Negative for dysphoric mood  The patient is not nervous/anxious            Past Medical History:   Diagnosis Date    Asthma     Stroke [facility])          Current Outpatient Medications:     albuterol (2 5 mg/3 mL) 0 083 % nebulizer solution, Take 2 5 mg by nebulization every 6 (six) hours as needed for wheezing, Disp: , Rfl:     albuterol (PROVENTIL HFA,VENTOLIN HFA) 90 mcg/act inhaler, Inhale 2 puffs 4 (four) times a day, Disp: 3 each, Rfl: 1    Ascorbic Acid (VITAMIN C GUMMIE PO), Take 1 tablet by mouth daily, Disp: , Rfl:     cetirizine (ZyrTEC) 10 mg tablet, Take 10 mg by mouth daily, Disp: , Rfl:     cholecalciferol (VITAMIN D3) 1,000 units tablet, Take 1,000 Units by mouth daily, Disp: , Rfl:     Coenzyme Q10 (COQ10) 100 MG CAPS, Take 1 capsule by mouth daily, Disp: , Rfl:     ELDERBERRY PO, Take 1 capsule by mouth daily , Disp: , Rfl:     fluticasone-salmeterol (ADVAIR, WIXELA) 250-50 mcg/dose inhaler, Inhale 1 puff 2 (two) times a day Rinse mouth after use , Disp: 1 Inhaler, Rfl: 5    Triamcinolone Acetonide 55 MCG/ACT AERO, 2 Act (110 mcg total) into each nostril daily, Disp: 1 Bottle, Rfl: 3    Vitamin Mixture (VITAMIN E COMPLETE PO), Take by mouth 267 mg  one pill per day, Disp: , Rfl:     Na Sulfate-K Sulfate-Mg Sulf (Suprep Bowel Prep Kit) 17 5-3 13-1 6 GM/177ML SOLN, Take as directed by office (Patient not taking: Reported on 6/21/2021), Disp: 2 Bottle, Rfl: 0    predniSONE 10 mg tablet, 40 mg x 3 days 30 mg x 3 days 20 mg x 3 days 10 mg x 3 days, Disp: 30 tablet, Rfl: 1    Allergies   Allergen Reactions    Mayonnaise Hives    Mold Extract [Trichophyton] Other (See Comments)     Asthma      Other Eye Swelling     Dogs, cats, leaves - pt has asthma    Dust Mite Extract Cough     asthma       Social History   History reviewed  No pertinent surgical history    Family History   Problem Relation Age of Onset    Cancer Father     Cancer Brother        Health Maintenance   Topic Date Due    Hepatitis C Screening  Never done    MAMMOGRAM  Never done    COVID-19 Vaccine (1) Never done    HIV Screening  Never done    Annual Physical  Never done    Cervical Cancer Screening  Never done    Influenza Vaccine (Season Ended) 09/01/2021    Depression Screening PHQ  06/21/2022    BMI: Adult  06/21/2022    Pneumococcal Vaccine: Pediatrics (0 to 5 Years) and At-Risk Patients (6 to 59 Years) (2 of 2 - PPSV23) 05/08/2023    Colorectal Cancer Screening  10/28/2023    DTaP,Tdap,and Td Vaccines (2 - Td or Tdap) 04/18/2026    HIB Vaccine  Aged Out    Hepatitis B Vaccine  Aged Out    IPV Vaccine  Aged Out    Hepatitis A Vaccine  Aged Out    Meningococcal ACWY Vaccine  Aged Out    HPV Vaccine  Aged Out     Immunization History   Administered Date(s) Administered    INFLUENZA 08/19/2014, 08/12/2017, 12/06/2018    Influenza, injectable, quadrivalent, preservative free 0 5 mL 09/30/2019    Pneumococcal Polysaccharide PPV23 04/18/2016    Tdap 04/18/2016         Objective:  /78   Pulse 55   Temp 97 5 °F (36 4 °C) (Temporal)   Ht 5' 3" (1 6 m)   Wt 59 2 kg (130 lb 9 6 oz)   SpO2 97%   BMI 23 13 kg/m²   Body mass index is 23 13 kg/m²       Physical Exam

## 2021-06-21 NOTE — ASSESSMENT & PLAN NOTE
Improved  Decrease stress  Limit salt in diet, decrease caffeine  Pt has rx for labs  Moniotr BP at home, call if > 140/90 consistently  F/u in 6 months

## 2021-07-06 ENCOUNTER — TELEPHONE (OUTPATIENT)
Dept: GASTROENTEROLOGY | Facility: MEDICAL CENTER | Age: 61
End: 2021-07-06

## 2021-07-07 ENCOUNTER — ANESTHESIA EVENT (OUTPATIENT)
Dept: GASTROENTEROLOGY | Facility: MEDICAL CENTER | Age: 61
End: 2021-07-07

## 2021-07-07 ENCOUNTER — ANESTHESIA (OUTPATIENT)
Dept: GASTROENTEROLOGY | Facility: MEDICAL CENTER | Age: 61
End: 2021-07-07

## 2021-07-07 ENCOUNTER — HOSPITAL ENCOUNTER (OUTPATIENT)
Dept: GASTROENTEROLOGY | Facility: MEDICAL CENTER | Age: 61
Setting detail: OUTPATIENT SURGERY
Discharge: HOME | End: 2021-07-07
Attending: INTERNAL MEDICINE | Admitting: INTERNAL MEDICINE
Payer: COMMERCIAL

## 2021-07-07 VITALS
HEIGHT: 63 IN | HEART RATE: 75 BPM | TEMPERATURE: 98.2 F | BODY MASS INDEX: 22.15 KG/M2 | WEIGHT: 125 LBS | SYSTOLIC BLOOD PRESSURE: 142 MMHG | RESPIRATION RATE: 16 BRPM | DIASTOLIC BLOOD PRESSURE: 63 MMHG | OXYGEN SATURATION: 100 %

## 2021-07-07 DIAGNOSIS — Z12.11 SPECIAL SCREENING FOR MALIGNANT NEOPLASMS, COLON: ICD-10-CM

## 2021-07-07 PROCEDURE — 45385 COLONOSCOPY W/LESION REMOVAL: CPT | Performed by: INTERNAL MEDICINE

## 2021-07-07 PROCEDURE — 88305 TISSUE EXAM BY PATHOLOGIST: CPT | Performed by: PATHOLOGY

## 2021-07-07 RX ORDER — LIDOCAINE HYDROCHLORIDE 20 MG/ML
INJECTION, SOLUTION EPIDURAL; INFILTRATION; INTRACAUDAL; PERINEURAL AS NEEDED
Status: DISCONTINUED | OUTPATIENT
Start: 2021-07-07 | End: 2021-07-07

## 2021-07-07 RX ORDER — PROPOFOL 10 MG/ML
INJECTION, EMULSION INTRAVENOUS AS NEEDED
Status: DISCONTINUED | OUTPATIENT
Start: 2021-07-07 | End: 2021-07-07

## 2021-07-07 RX ORDER — SODIUM CHLORIDE 9 MG/ML
125 INJECTION, SOLUTION INTRAVENOUS CONTINUOUS
Status: DISCONTINUED | OUTPATIENT
Start: 2021-07-07 | End: 2021-07-11 | Stop reason: HOSPADM

## 2021-07-07 RX ADMIN — PROPOFOL 100 MG: 10 INJECTION, EMULSION INTRAVENOUS at 13:17

## 2021-07-07 RX ADMIN — LIDOCAINE HYDROCHLORIDE 2 ML: 20 INJECTION, SOLUTION EPIDURAL; INFILTRATION; INTRACAUDAL; PERINEURAL at 13:17

## 2021-07-07 RX ADMIN — PROPOFOL 50 MG: 10 INJECTION, EMULSION INTRAVENOUS at 13:30

## 2021-07-07 RX ADMIN — PROPOFOL 50 MG: 10 INJECTION, EMULSION INTRAVENOUS at 13:26

## 2021-07-07 RX ADMIN — PROPOFOL 50 MG: 10 INJECTION, EMULSION INTRAVENOUS at 13:22

## 2021-07-07 RX ADMIN — SODIUM CHLORIDE 125 ML/HR: 0.9 INJECTION, SOLUTION INTRAVENOUS at 13:12

## 2021-07-07 RX ADMIN — Medication 40 MG: at 13:21

## 2021-07-07 RX ADMIN — PROPOFOL 50 MG: 10 INJECTION, EMULSION INTRAVENOUS at 13:19

## 2021-07-07 NOTE — DISCHARGE INSTRUCTIONS
Colonoscopy   WHAT YOU NEED TO KNOW:   A colonoscopy is a procedure to examine the inside of your colon (intestine) with a scope  Polyps or tissue growths may have been removed during your colonoscopy  It is normal to feel bloated and to have some abdominal discomfort  You should be passing gas  If you have hemorrhoids or you had polyps removed, you may have a small amount of bleeding  DISCHARGE INSTRUCTIONS:   Seek care immediately if:    You have sudden, severe abdominal pain   You have problems swallowing   You have a large amount of black, sticky bowel movements or blood in your bowel movements   You have sudden trouble breathing   You feel weak, lightheaded, or faint or your heart beats faster than normal for you  Contact your healthcare provider if:    You have a fever and chills   You have nausea or are vomiting   Your abdomen is bloated or feels full and hard   You have abdominal pain   You have black, sticky bowel movements or blood in your bowel movements   You have not had a bowel movement for 3 days after your procedure   You have rash or hives   You have questions or concerns about your procedure  Activity:    Do not lift, strain, or run for 24 hours after your procedure   Rest after your procedure  You have been given medicine to relax you  Do not drive or make important decisions until the day after your procedure  Return to your normal activity as directed   Relieve gas and discomfort from bloating by lying on your right side with a heating pad on your abdomen  You may need to take short walks to help the gas move out  Eat small meals until bloating is relieved  Follow up with your healthcare provider as directed: Write down your questions so you remember to ask them during your visits  If you take a blood thinner, please review the specific instructions from your endoscopist about when you should resume it   These can be found in the Recommendation and Your Medication list sections of this After Visit Summary  Colorectal Polyps   WHAT YOU NEED TO KNOW:   What are colorectal polyps? Colorectal polyps are small growths of tissue in the lining of the colon and rectum  Most polyps are hyperplastic polyps and are usually benign (noncancerous)  Certain types of polyps, called adenomatous polyps, may turn into cancer  What increases my risk of colorectal polyps? The exact cause of colorectal polyps is unknown  The following may increase your risk:  · Older age    · A diet of foods high in fat and low in fiber     · Family history of polyps    · Intestinal diseases, such as Crohn's disease or ulcerative colitis    · An unhealthy lifestyle, such as physical inactivity, smoking, or drinking alcohol    · Obesity    What are the signs and symptoms of colorectal polyps? · Blood in your bowel movement or bleeding from the rectum    · Change in bowel movement habits, such as diarrhea and constipation    · Abdominal pain    How are colorectal polyps diagnosed? You should have fecal blood screening once a year for colorectal disease if you are over 48years old  You should be screened earlier if you have an intestinal disease or a family history of polyps or colorectal cancer  During this screening, a sample of your bowel movement is checked for blood, which may be an early sign of colorectal polyps or cancer  You may also need any of the following tests:  · Digital rectal exam:  Your healthcare provider will examine your anus and use a finger to check your rectum for polyps  · Barium enema: A barium enema is an x-ray of the colon  A tube is put into your anus, and a liquid called barium is put through the tube  Barium is used so that healthcare providers can see your colon better on the x-ray film  · Virtual colonoscopy: This is a CT scan that takes pictures of the inside of your colon and rectum   A small, flexible tube is put into your rectum and air or carbon dioxide (gas) is used to expand your colon  This lets healthcare providers clearly see your colon and any polyps on a monitor  · Colonoscopy or sigmoidoscopy: These procedures help your healthcare provider see the inside of your colon using a flexible tube with a small light and camera on the end  During a sigmoidoscopy, your healthcare provider will only look at rectum and lower colon  During a colonoscopy, healthcare providers will look at the full length of your colon  Healthcare providers may remove a small amount of tissue from the colon for a biopsy  How are colorectal polyps treated? A polypectomy is a minimally invasive procedure to remove your polyps  They may be removed during a colonoscopy or sigmoidoscopy  Your healthcare provider may need to remove the polyps with a laparoscope  Laparoscopy is done by inserting a small, flexible scope into incisions made on your abdomen  What are the risks of colorectal polyps? You may bleed during a colonoscopy procedure  Your bowel may be perforated (torn) when polyps are removed  This may lead to an open abdominal surgery  During surgery, you may bleed too much or get an infection  Adenomatous polyps that are not removed may turn into cancer and become more difficult to treat  Where can I find support and more information? · Mohini Natarajan ([facility])  [address], [place] [address]  Phone: [phone]  Web Address: Minh Simeon  [facility] [employer] go[employer]    When should I contact my healthcare provider? · You have a fever  · You have chills, a cough, or feel weak and achy  · You have abdominal pain that does not go away or gets worse after you take medicine  · Your abdomen is swollen  · You are losing weight without trying  · You have questions or concerns about your condition or care  When should I seek immediate care or call 911?    · You have sudden shortness of breath  · You have a fast heart rate, fast breathing, or are too dizzy to stand up  · You have severe abdominal pain  · You see blood in your bowel movement  CARE AGREEMENT:   You have the right to help plan your care  Learn about your health condition and how it may be treated  Discuss treatment options with your healthcare providers to decide what care you want to receive  You always have the right to refuse treatment  The above information is an  only  It is not intended as medical advice for individual conditions or treatments  Talk to your doctor, nurse or pharmacist before following any medical regimen to see if it is safe and effective for you  © Copyright [address] Information is for End User's use only and may not be sold, redistributed or otherwise used for commercial purposes   All illustrations and images included in CareNotes® are the copyrighted property of [employer]  or [address] [place] [address]

## 2021-07-07 NOTE — ANESTHESIA PREPROCEDURE EVALUATION
Procedure:  COLONOSCOPY    Relevant Problems   CARDIO   (+) Essential hypertension      PULMONARY   (+) Mild persistent asthma        Physical Exam    Airway    Mallampati score: I  TM Distance: >3 FB  Neck ROM: full     Dental   No notable dental hx     Cardiovascular  Cardiovascular exam normal    Pulmonary  Pulmonary exam normal     Other Findings        Anesthesia Plan  ASA Score- 3     Anesthesia Type- IV sedation with anesthesia with ASA Monitors  Additional Monitors:   Airway Plan:           Plan Factors-    Chart reviewed  Induction- intravenous  Postoperative Plan-     Informed Consent- Anesthetic plan and risks discussed with patient

## 2021-07-07 NOTE — H&P
History and Physical -  [facility]  Despina Homans 59 y o  female MRN: [id number]                  HPI: Despina Homans is a 59y o  year old female who presents for colonoscopy due to positive Cologuard  REVIEW OF SYSTEMS: Per the HPI, and otherwise unremarkable  Historical Information   Past Medical History:   Diagnosis Date    Asthma     Stroke [facility])      No past surgical history on file  Social History   Social History     Substance and Sexual Activity   Alcohol Use Yes    Comment: social- every 2 months     Social History     Substance and Sexual Activity   Drug Use Never     Social History     Tobacco Use   Smoking Status Never Smoker   Smokeless Tobacco Never Used     Family History   Problem Relation Age of Onset    Cancer Father     Cancer Brother        Meds/Allergies       Current Outpatient Medications:     albuterol (2 5 mg/3 mL) 0 083 % nebulizer solution    albuterol (PROVENTIL HFA,VENTOLIN HFA) 90 mcg/act inhaler    Ascorbic Acid (VITAMIN C GUMMIE PO)    cetirizine (ZyrTEC) 10 mg tablet    cholecalciferol (VITAMIN D3) 1,000 units tablet    Coenzyme Q10 (COQ10) 100 MG CAPS    ELDERBERRY PO    fluticasone-salmeterol (ADVAIR, WIXELA) 250-50 mcg/dose inhaler    Na Sulfate-K Sulfate-Mg Sulf (Suprep Bowel Prep Kit) 17 5-3 13-1 6 GM/177ML SOLN    predniSONE 10 mg tablet    Triamcinolone Acetonide 55 MCG/ACT AERO    Vitamin Mixture (VITAMIN E COMPLETE PO)    Current Facility-Administered Medications:     sodium chloride 0 9 % infusion, 125 mL/hr, Intravenous, Continuous    Allergies   Allergen Reactions    Mayonnaise Hives    Mold Extract [Trichophyton] Other (See Comments)     Asthma      Other Eye Swelling     Dogs, cats, leaves - pt has asthma    Dust Mite Extract Cough     asthma       Objective     There were no vitals taken for this visit        PHYSICAL EXAM    Gen: NAD  Head: NCAT  CV: RRR  CHEST: Clear  ABD: soft, NT/ND  EXT: no edema      ASSESSMENT/PLAN: This is a 59y o  year old female here for colonoscopy evaluation and she is stable and optimized for her procedure

## 2021-07-09 ENCOUNTER — TELEPHONE (OUTPATIENT)
Dept: GASTROENTEROLOGY | Facility: CLINIC | Age: 61
End: 2021-07-09

## 2021-07-09 NOTE — TELEPHONE ENCOUNTER
----- Message from Wally Gonzalez MD sent at 7/9/2021 11:26 AM EDT -----  Multiple colonic polyps removed were tubular adenomas repeat in 3 years

## 2021-11-02 ENCOUNTER — HOSPITAL ENCOUNTER (OUTPATIENT)
Dept: RADIOLOGY | Facility: MEDICAL CENTER | Age: 61
Discharge: HOME | End: 2021-11-02
Payer: COMMERCIAL

## 2021-11-02 VITALS — WEIGHT: 125 LBS | HEIGHT: 63 IN | BODY MASS INDEX: 22.15 KG/M2

## 2021-11-02 DIAGNOSIS — Z12.31 ENCOUNTER FOR SCREENING MAMMOGRAM FOR MALIGNANT NEOPLASM OF BREAST: ICD-10-CM

## 2021-11-02 PROCEDURE — 77063 BREAST TOMOSYNTHESIS BI: CPT

## 2021-11-02 PROCEDURE — 77067 SCR MAMMO BI INCL CAD: CPT

## 2021-12-20 ENCOUNTER — OFFICE VISIT (OUTPATIENT)
Dept: INTERNAL MEDICINE CLINIC | Age: 61
End: 2021-12-20
Payer: COMMERCIAL

## 2021-12-20 VITALS
HEIGHT: 63 IN | HEART RATE: 58 BPM | TEMPERATURE: 97.4 F | OXYGEN SATURATION: 98 % | WEIGHT: 129 LBS | DIASTOLIC BLOOD PRESSURE: 82 MMHG | BODY MASS INDEX: 22.86 KG/M2 | SYSTOLIC BLOOD PRESSURE: 140 MMHG

## 2021-12-20 DIAGNOSIS — I10 ESSENTIAL HYPERTENSION: ICD-10-CM

## 2021-12-20 DIAGNOSIS — Z71.85 IMMUNIZATION COUNSELING: ICD-10-CM

## 2021-12-20 DIAGNOSIS — Z11.59 ENCOUNTER FOR HEPATITIS C SCREENING TEST FOR LOW RISK PATIENT: ICD-10-CM

## 2021-12-20 DIAGNOSIS — Z12.31 BREAST CANCER SCREENING BY MAMMOGRAM: Primary | ICD-10-CM

## 2021-12-20 DIAGNOSIS — R19.5 POSITIVE COLORECTAL CANCER SCREENING USING COLOGUARD TEST: ICD-10-CM

## 2021-12-20 DIAGNOSIS — J45.30 MILD PERSISTENT ASTHMA WITHOUT COMPLICATION: ICD-10-CM

## 2021-12-20 DIAGNOSIS — J45.909 UNCOMPLICATED ASTHMA, UNSPECIFIED ASTHMA SEVERITY, UNSPECIFIED WHETHER PERSISTENT: ICD-10-CM

## 2021-12-20 PROCEDURE — 1036F TOBACCO NON-USER: CPT | Performed by: INTERNAL MEDICINE

## 2021-12-20 PROCEDURE — 99214 OFFICE O/P EST MOD 30 MIN: CPT | Performed by: INTERNAL MEDICINE

## 2021-12-20 PROCEDURE — 3077F SYST BP >= 140 MM HG: CPT | Performed by: INTERNAL MEDICINE

## 2021-12-20 PROCEDURE — 3725F SCREEN DEPRESSION PERFORMED: CPT | Performed by: INTERNAL MEDICINE

## 2021-12-20 PROCEDURE — 3008F BODY MASS INDEX DOCD: CPT | Performed by: INTERNAL MEDICINE

## 2021-12-20 PROCEDURE — 3079F DIAST BP 80-89 MM HG: CPT | Performed by: INTERNAL MEDICINE

## 2021-12-20 RX ORDER — ALBUTEROL SULFATE 90 UG/1
2 AEROSOL, METERED RESPIRATORY (INHALATION) 4 TIMES DAILY
Qty: 8.5 G | Refills: 2 | Status: SHIPPED | OUTPATIENT
Start: 2021-12-20

## 2022-01-25 ENCOUNTER — APPOINTMENT (OUTPATIENT)
Dept: LAB | Age: 62
End: 2022-01-25
Payer: COMMERCIAL
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
## 2022-01-25 DIAGNOSIS — I10 ESSENTIAL HYPERTENSION: ICD-10-CM

## 2022-01-25 DIAGNOSIS — Z11.59 ENCOUNTER FOR HEPATITIS C SCREENING TEST FOR LOW RISK PATIENT: ICD-10-CM

## 2022-01-25 LAB
ALBUMIN SERPL BCP-MCNC: 3.6 G/DL (ref 3.5–5)
ALP SERPL-CCNC: 100 U/L (ref 46–116)
ALT SERPL W P-5'-P-CCNC: 54 U/L (ref 12–78)
ANION GAP SERPL CALCULATED.3IONS-SCNC: 3 MMOL/L (ref 4–13)
AST SERPL W P-5'-P-CCNC: 38 U/L (ref 5–45)
BASOPHILS # BLD AUTO: 0.04 THOUSANDS/ΜL (ref 0–0.1)
BASOPHILS NFR BLD AUTO: 1 % (ref 0–1)
BILIRUB SERPL-MCNC: 1.16 MG/DL (ref 0.2–1)
BUN SERPL-MCNC: 16 MG/DL (ref 5–25)
CALCIUM SERPL-MCNC: 9.7 MG/DL (ref 8.3–10.1)
CHLORIDE SERPL-SCNC: 108 MMOL/L (ref 100–108)
CHOLEST SERPL-MCNC: 207 MG/DL
CO2 SERPL-SCNC: 30 MMOL/L (ref 21–32)
CREAT SERPL-MCNC: 0.92 MG/DL (ref 0.6–1.3)
EOSINOPHIL # BLD AUTO: 0.22 THOUSAND/ΜL (ref 0–0.61)
EOSINOPHIL NFR BLD AUTO: 5 % (ref 0–6)
ERYTHROCYTE [DISTWIDTH] IN BLOOD BY AUTOMATED COUNT: 11.9 % (ref 11.6–15.1)
GFR SERPL CREATININE-BSD FRML MDRD: 66 ML/MIN/1.73SQ M
GLUCOSE P FAST SERPL-MCNC: 76 MG/DL (ref 65–99)
HCT VFR BLD AUTO: 47.6 % (ref 34.8–46.1)
HCV AB SER QL: NORMAL
HDLC SERPL-MCNC: 79 MG/DL
HGB BLD-MCNC: 16.1 G/DL (ref 11.5–15.4)
IMM GRANULOCYTES # BLD AUTO: 0.01 THOUSAND/UL (ref 0–0.2)
IMM GRANULOCYTES NFR BLD AUTO: 0 % (ref 0–2)
LDLC SERPL CALC-MCNC: 112 MG/DL (ref 0–100)
LYMPHOCYTES # BLD AUTO: 1.77 THOUSANDS/ΜL (ref 0.6–4.47)
LYMPHOCYTES NFR BLD AUTO: 37 % (ref 14–44)
MCH RBC QN AUTO: 29.8 PG (ref 26.8–34.3)
MCHC RBC AUTO-ENTMCNC: 33.8 G/DL (ref 31.4–37.4)
MCV RBC AUTO: 88 FL (ref 82–98)
MONOCYTES # BLD AUTO: 0.4 THOUSAND/ΜL (ref 0.17–1.22)
MONOCYTES NFR BLD AUTO: 8 % (ref 4–12)
NEUTROPHILS # BLD AUTO: 2.34 THOUSANDS/ΜL (ref 1.85–7.62)
NEUTS SEG NFR BLD AUTO: 49 % (ref 43–75)
NONHDLC SERPL-MCNC: 128 MG/DL
NRBC BLD AUTO-RTO: 0 /100 WBCS
PLATELET # BLD AUTO: 228 THOUSANDS/UL (ref 149–390)
PMV BLD AUTO: 10.1 FL (ref 8.9–12.7)
POTASSIUM SERPL-SCNC: 4.5 MMOL/L (ref 3.5–5.3)
PROT SERPL-MCNC: 7.3 G/DL (ref 6.4–8.2)
RBC # BLD AUTO: 5.41 MILLION/UL (ref 3.81–5.12)
SODIUM SERPL-SCNC: 141 MMOL/L (ref 136–145)
TRIGL SERPL-MCNC: 78 MG/DL
TSH SERPL DL<=0.05 MIU/L-ACNC: 0.74 UIU/ML (ref 0.36–3.74)
WBC # BLD AUTO: 4.78 THOUSAND/UL (ref 4.31–10.16)

## 2022-01-25 PROCEDURE — 80053 COMPREHEN METABOLIC PANEL: CPT

## 2022-01-25 PROCEDURE — 36415 COLL VENOUS BLD VENIPUNCTURE: CPT

## 2022-01-25 PROCEDURE — 80061 LIPID PANEL: CPT

## 2022-01-25 PROCEDURE — 84443 ASSAY THYROID STIM HORMONE: CPT

## 2022-01-25 PROCEDURE — 86618 LYME DISEASE ANTIBODY: CPT

## 2022-01-25 PROCEDURE — 86803 HEPATITIS C AB TEST: CPT

## 2022-01-25 PROCEDURE — 85025 COMPLETE CBC W/AUTO DIFF WBC: CPT

## 2022-01-26 LAB — B BURGDOR IGG+IGM SER-ACNC: 31

## 2022-03-14 ENCOUNTER — OFFICE VISIT (OUTPATIENT)
Dept: INTERNAL MEDICINE CLINIC | Age: 62
End: 2022-03-14
Payer: COMMERCIAL

## 2022-03-14 VITALS
WEIGHT: 134 LBS | HEART RATE: 63 BPM | BODY MASS INDEX: 23.74 KG/M2 | SYSTOLIC BLOOD PRESSURE: 140 MMHG | TEMPERATURE: 97.8 F | OXYGEN SATURATION: 97 % | HEIGHT: 63 IN | DIASTOLIC BLOOD PRESSURE: 90 MMHG

## 2022-03-14 DIAGNOSIS — Z91.09 ENVIRONMENTAL ALLERGIES: ICD-10-CM

## 2022-03-14 DIAGNOSIS — H01.001 BLEPHARITIS OF UPPER EYELIDS OF BOTH EYES, UNSPECIFIED TYPE: Primary | ICD-10-CM

## 2022-03-14 DIAGNOSIS — H01.004 BLEPHARITIS OF UPPER EYELIDS OF BOTH EYES, UNSPECIFIED TYPE: Primary | ICD-10-CM

## 2022-03-14 DIAGNOSIS — I10 ESSENTIAL HYPERTENSION: ICD-10-CM

## 2022-03-14 PROCEDURE — 1036F TOBACCO NON-USER: CPT | Performed by: INTERNAL MEDICINE

## 2022-03-14 PROCEDURE — 3008F BODY MASS INDEX DOCD: CPT | Performed by: INTERNAL MEDICINE

## 2022-03-14 PROCEDURE — 3080F DIAST BP >= 90 MM HG: CPT | Performed by: INTERNAL MEDICINE

## 2022-03-14 PROCEDURE — 3077F SYST BP >= 140 MM HG: CPT | Performed by: INTERNAL MEDICINE

## 2022-03-14 PROCEDURE — 99213 OFFICE O/P EST LOW 20 MIN: CPT | Performed by: INTERNAL MEDICINE

## 2022-03-14 RX ORDER — FLUTICASONE PROPIONATE 50 MCG
2 SPRAY, SUSPENSION (ML) NASAL DAILY
Qty: 18.2 ML | Refills: 5 | Status: SHIPPED | OUTPATIENT
Start: 2022-03-14

## 2022-03-14 RX ORDER — FEXOFENADINE HCL 180 MG/1
180 TABLET ORAL DAILY
Qty: 90 TABLET | Refills: 1 | Status: SHIPPED | OUTPATIENT
Start: 2022-03-14

## 2022-03-14 NOTE — PROGRESS NOTES
Assessment/Plan:    Essential hypertension  Discussed again elevated blood pressure and risks  Pt not interested in medication at this time even though I recommended it  Feels it will come down with decreased stress, exercise  Eats a healthy diet  F/u in 1 month    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  Wash with baby shampoo  Apply gent/prednislone topically QID until clear  Avoid harsh products  Change zyrtec to allegra    Environmental allergies  Change meds as been on same regimen for a while and not as effective  Allegra 180 mg daily and flonase rx sent  Avoid allergens  Call if symptoms worsen        Diagnoses and all orders for this visit:    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, unspecified type  -     gentamicin-prednisoLONE (PRED-G) 0 3-0 6 % ophthalmic ointment; Administer to both eyes 2 (two) times a day    Essential hypertension    Environmental allergies  -     fexofenadine (ALLEGRA) 180 MG tablet; Take 1 tablet (180 mg total) by mouth daily  -     fluticasone (FLONASE) 50 mcg/act nasal spray; 2 sprays into each nostril daily               Subjective:          Patient ID: Gisele Kehr is a 59 y o  female  Rash around eyes x 3 weeks  Skin is red, dry, flaking, bumpy  She has been washing her face and applying the same moisturizer she always has  No exposure to poison ivy  She has been avoiding mascara  She has more eye watering, itching  No changes in vision  Occasionally eyes will be red in the morning and occasionally have "goo" in the morning  No sneezing, congestion, sneezing  The following portions of the patient's history were reviewed and updated as appropriate: past family history, past surgical history and problem list     Review of Systems   Constitutional: Negative for chills, fatigue and fever  HENT: Negative for congestion and sore throat  Eyes: Negative for visual disturbance  Respiratory: Negative for cough and shortness of breath      Cardiovascular: Negative for chest pain, palpitations and leg swelling  Gastrointestinal: Negative for abdominal pain, constipation, diarrhea, nausea and vomiting  Neurological: Negative for dizziness and headaches  Psychiatric/Behavioral: Negative for dysphoric mood  The patient is not nervous/anxious  Past Medical History:   Diagnosis Date    Asthma     Stroke [facility]) 2001         Current Outpatient Medications:     albuterol (PROVENTIL HFA,VENTOLIN HFA) 90 mcg/act inhaler, Inhale 2 puffs 4 (four) times a day, Disp: 8 5 g, Rfl: 2    Ascorbic Acid (VITAMIN C GUMMIE PO), Take 1 tablet by mouth daily, Disp: , Rfl:     cholecalciferol (VITAMIN D3) 1,000 units tablet, Take 1,000 Units by mouth daily, Disp: , Rfl:     Coenzyme Q10 (COQ10) 100 MG CAPS, Take 1 capsule by mouth daily, Disp: , Rfl:     ELDERBERRY PO, Take 1 capsule by mouth daily , Disp: , Rfl:     fluticasone-salmeterol (Advair) 250-50 mcg/dose inhaler, Inhale 1 puff 2 (two) times a day Rinse mouth after use , Disp: 60 blister, Rfl: 3    Vitamin Mixture (VITAMIN E COMPLETE PO), Take by mouth 267 mg  one pill per day, Disp: , Rfl:     albuterol (2 5 mg/3 mL) 0 083 % nebulizer solution, Take 2 5 mg by nebulization every 6 (six) hours as needed for wheezing (Patient not taking: Reported on 12/20/2021 ), Disp: , Rfl:     fexofenadine (ALLEGRA) 180 MG tablet, Take 1 tablet (180 mg total) by mouth daily, Disp: 90 tablet, Rfl: 1    fluticasone (FLONASE) 50 mcg/act nasal spray, 2 sprays into each nostril daily, Disp: 18 2 mL, Rfl: 5    gentamicin-prednisoLONE (PRED-G) 0 3-0 6 % ophthalmic ointment, Administer to both eyes 2 (two) times a day, Disp: 3 5 g, Rfl: 0    Allergies   Allergen Reactions    Mayonnaise Hives    Mold Extract [Trichophyton] Other (See Comments)     Asthma      Other Eye Swelling     Dogs, cats, leaves - pt has asthma    Dust Mite Extract Cough     asthma       Social History   History reviewed   No pertinent surgical history  Family History   Problem Relation Age of Onset    Cancer Father     Cancer Brother     No Known Problems Mother     No Known Problems Sister     No Known Problems Maternal Grandmother     No Known Problems Maternal Grandfather     No Known Problems Paternal Grandmother     No Known Problems Paternal Grandfather     No Known Problems Sister     No Known Problems Son     No Known Problems Maternal Aunt     No Known Problems Paternal Aunt        Health Maintenance   Topic Date Due    COVID-19 Vaccine (1) Never done    HIV Screening  Never done    Annual Physical  Never done    Cervical Cancer Screening  Never done    Breast Cancer Screening: Mammogram  11/02/2022    Depression Screening  12/20/2022    BMI: Adult  03/14/2023    Pneumococcal Vaccine: Pediatrics (0 to 5 Years) and At-Risk Patients (6 to 59 Years) (2 of 2 - PPSV23) 05/08/2023    Colorectal Cancer Screening  07/06/2024    DTaP,Tdap,and Td Vaccines (2 - Td or Tdap) 04/18/2026    Hepatitis C Screening  Completed    Influenza Vaccine  Completed    HIB Vaccine  Aged Out    Hepatitis B Vaccine  Aged Out    IPV Vaccine  Aged Out    Hepatitis A Vaccine  Aged Out    Meningococcal ACWY Vaccine  Aged Out    HPV Vaccine  Aged Out     Immunization History   Administered Date(s) Administered    INFLUENZA 08/19/2014, 08/12/2017, 12/06/2018, 11/04/2021, 11/04/2021    Influenza, injectable, quadrivalent, preservative free 0 5 mL 09/30/2019    Pneumococcal Polysaccharide PPV23 04/18/2016    Tdap 04/18/2016         Objective:  /90 (BP Location: Left arm, Patient Position: Sitting, Cuff Size: Adult)   Pulse 63   Temp 97 8 °F (36 6 °C) (Temporal)   Ht 5' 3" (1 6 m)   Wt 60 8 kg (134 lb)   SpO2 97%   BMI 23 74 kg/m²   Body mass index is 23 74 kg/m²  Physical Exam  Constitutional:       Appearance: She is well-developed  HENT:      Head: Normocephalic and atraumatic        Right Ear: Tympanic membrane, ear canal and external ear normal       Left Ear: Tympanic membrane, ear canal and external ear normal       Nose: Nose normal    Eyes:      General:         Right eye: No discharge  Left eye: No discharge  Extraocular Movements: Extraocular movements intact  Conjunctiva/sclera: Conjunctivae normal       Pupils: Pupils are equal, round, and reactive to light  Comments: Superior periorbital erythema, flaking, dryness, small papules  Cardiovascular:      Rate and Rhythm: Normal rate and regular rhythm  Heart sounds: Normal heart sounds  Pulmonary:      Effort: Pulmonary effort is normal       Breath sounds: Normal breath sounds  Musculoskeletal:      Cervical back: Normal range of motion and neck supple  Skin:     General: Skin is warm and dry  Neurological:      Mental Status: She is alert

## 2022-03-14 NOTE — ASSESSMENT & PLAN NOTE
Change meds as been on same regimen for a while and not as effective  Allegra 180 mg daily and flonase rx sent  Avoid allergens  Call if symptoms worsen

## 2022-03-14 NOTE — ASSESSMENT & PLAN NOTE
Discussed again elevated blood pressure and risks  Pt not interested in medication at this time even though I recommended it  Feels it will come down with decreased stress, exercise  Eats a healthy diet  F/u in 1 month

## 2022-03-14 NOTE — ASSESSMENT & PLAN NOTE
Wash with baby shampoo  Apply gent/prednislone topically QID until clear  Avoid harsh products  Change zyrtec to allegra

## 2022-05-11 ENCOUNTER — TELEMEDICINE (OUTPATIENT)
Dept: INTERNAL MEDICINE CLINIC | Age: 62
End: 2022-05-11
Payer: COMMERCIAL

## 2022-05-11 DIAGNOSIS — J45.30 MILD PERSISTENT ASTHMA WITHOUT COMPLICATION: ICD-10-CM

## 2022-05-11 DIAGNOSIS — J30.1 SEASONAL ALLERGIC RHINITIS DUE TO POLLEN: Primary | ICD-10-CM

## 2022-05-11 DIAGNOSIS — R05.9 COUGH: ICD-10-CM

## 2022-05-11 DIAGNOSIS — J01.10 ACUTE NON-RECURRENT FRONTAL SINUSITIS: ICD-10-CM

## 2022-05-11 PROCEDURE — 1036F TOBACCO NON-USER: CPT | Performed by: PHYSICIAN ASSISTANT

## 2022-05-11 PROCEDURE — 99213 OFFICE O/P EST LOW 20 MIN: CPT | Performed by: PHYSICIAN ASSISTANT

## 2022-05-11 RX ORDER — AZITHROMYCIN 250 MG/1
TABLET, FILM COATED ORAL
Qty: 6 TABLET | Refills: 0 | Status: SHIPPED | OUTPATIENT
Start: 2022-05-11 | End: 2022-05-16

## 2022-05-11 RX ORDER — BENZONATATE 100 MG/1
100 CAPSULE ORAL 3 TIMES DAILY PRN
Qty: 30 CAPSULE | Refills: 0 | Status: SHIPPED | OUTPATIENT
Start: 2022-05-11

## 2022-05-11 NOTE — PROGRESS NOTES
Virtual Regular Visit    Verification of patient location:    Patient is located in the following state in which I hold an active license [place]      Assessment/Plan:    Problem List Items Addressed This Visit        Respiratory    Mild persistent asthma      Other Visit Diagnoses     Seasonal allergic rhinitis due to pollen    -  Primary    Acute non-recurrent frontal sinusitis        Relevant Medications    azithromycin (Zithromax) 250 mg tablet    Cough        Relevant Medications    benzonatate (TESSALON PERLES) 100 mg capsule      take zpak with food, probiotic daily  F/u in 1-2 if sx not improving, recommend CXR and f/u in office  Pt to increase fluids, may use tea with honey to suppress cough         Reason for visit is   Chief Complaint   Patient presents with    Virtual Brief Visit     Phone call only-Sore throat, cough, sinus congestion, started sat- neg covid test 5/10/22, 5/11/22- both negative - nephew has sinus infection    Virtual Regular Visit        Encounter provider Mima Sawant PA-C    Provider located at [address] [address]      Recent Visits  No visits were found meeting these conditions  Showing recent visits within past 7 days and meeting all other requirements  Today's Visits  Date Type Provider Dept   05/11/22 Telemedicine iMma Sawant PA-C [facility]   Showing today's visits and meeting all other requirements  Future Appointments  No visits were found meeting these conditions  Showing future appointments within next 150 days and meeting all other requirements       The patient was identified by name and date of birth  Aleksandra Betancourt was informed that this is a telemedicine visit and that the visit is being conducted through Telephone  My office door was closed  No one else was in the room    She acknowledged consent and understanding of privacy and security of the video platform  The patient has agreed to participate and understands they can discontinue the visit at any time  Patient is aware this is a billable service  It was my intent to perform this visit via video technology but the patient was not able to do a video connection so the visit was completed via audio telephone only  Subjective  Sterling Oakley is a 57 y o  female   56 y/o female with hx of htn, asthma  C/o cough and congestion since this past weekend, +frontal sinus pressure x 2 days   +cough with exp wheeze - using albuterol prn   States her nephew coughed on her and her sx started  Did 2 home covid tests which were negative  No ST, ear pain, ab pain or change in BM    No fever documented          Past Medical History:   Diagnosis Date    Asthma     Stroke ([facility] [address] ) 2001       History reviewed  No pertinent surgical history  Current Outpatient Medications   Medication Sig Dispense Refill    albuterol (PROVENTIL HFA,VENTOLIN HFA) 90 mcg/act inhaler Inhale 2 puffs 4 (four) times a day 8 5 g 2    Ascorbic Acid (VITAMIN C GUMMIE PO) Take 1 tablet by mouth daily      azithromycin (Zithromax) 250 mg tablet Take 2 tablets (500 mg total) by mouth daily for 1 day, THEN 1 tablet (250 mg total) daily for 4 days  6 tablet 0    dmcl-xmvewvi-nfo-hydrocort (CORTISPORIN) 1 % ointment Administer to both eyes every 4 (four) hours 3 5 g 0    benzonatate (TESSALON PERLES) 100 mg capsule Take 1 capsule (100 mg total) by mouth as needed in the morning and 1 capsule (100 mg total) as needed at noon and 1 capsule (100 mg total) as needed in the evening for cough   30 capsule 0    cholecalciferol (VITAMIN D3) 1,000 units tablet Take 1,000 Units by mouth daily      Coenzyme Q10 (COQ10) 100 MG CAPS Take 1 capsule by mouth daily      ELDERBERRY PO Take 1 capsule by mouth daily       fexofenadine (ALLEGRA) 180 MG tablet Take 1 tablet (180 mg total) by mouth daily 90 tablet 1    fluticasone (FLONASE) 50 mcg/act nasal spray 2 sprays into each nostril daily 18 2 mL 5    fluticasone-salmeterol (Advair) 250-50 mcg/dose inhaler Inhale 1 puff 2 (two) times a day Rinse mouth after use  60 blister 3    Vitamin Mixture (VITAMIN E COMPLETE PO) Take by mouth 267 mg  one pill per day      albuterol (2 5 mg/3 mL) 0 083 % nebulizer solution Take 2 5 mg by nebulization every 6 (six) hours as needed for wheezing (Patient not taking: Reported on 5/11/2022)       No current facility-administered medications for this visit  Allergies   Allergen Reactions    Mayonnaise Hives    Mold Extract [Trichophyton] Other (See Comments)     Asthma      Other Eye Swelling     Dogs, cats, leaves - pt has asthma    Dust Mite Extract Cough     asthma       Review of Systems   Constitutional: Negative for activity change, appetite change, chills, diaphoresis, fatigue and fever  HENT: Positive for congestion, postnasal drip, sinus pressure and sinus pain  Negative for sore throat  Eyes: Negative for pain and redness  Respiratory: Positive for cough  Negative for shortness of breath and wheezing  Cardiovascular: Negative for chest pain  Gastrointestinal: Negative for abdominal pain, constipation, diarrhea and nausea  Musculoskeletal: Negative for arthralgias  Skin: Negative for rash  Neurological: Positive for headaches  Negative for dizziness and light-headedness  Psychiatric/Behavioral: Positive for sleep disturbance  The patient is not nervous/anxious  Video Exam    There were no vitals filed for this visit  I spent 15 minutes directly with the patient during this visit    VIRTUAL VISIT DISCLAIMER      Edmond Pang verbally agrees to participate in [employer]   Pt is aware that [employer] could be limited without vital signs or the ability to perform a full hands-on physical Berton Slot understands she or the provider may request at any time to terminate the video visit and request the patient to seek care or treatment in person

## 2022-10-21 ENCOUNTER — TELEMEDICINE (OUTPATIENT)
Dept: INTERNAL MEDICINE CLINIC | Facility: CLINIC | Age: 62
End: 2022-10-21
Payer: COMMERCIAL

## 2022-10-21 DIAGNOSIS — U07.1 COVID-19: ICD-10-CM

## 2022-10-21 DIAGNOSIS — Z12.31 ENCOUNTER FOR SCREENING MAMMOGRAM FOR MALIGNANT NEOPLASM OF BREAST: Primary | ICD-10-CM

## 2022-10-21 PROCEDURE — 99213 OFFICE O/P EST LOW 20 MIN: CPT | Performed by: NURSE PRACTITIONER

## 2022-10-21 RX ORDER — PREDNISONE 20 MG/1
40 TABLET ORAL DAILY
Qty: 10 TABLET | Refills: 0 | Status: SHIPPED | OUTPATIENT
Start: 2022-10-21 | End: 2022-10-26

## 2022-10-21 NOTE — PROGRESS NOTES
COVID-19 Outpatient Progress Note    Assessment/Plan:    Problem List Items Addressed This Visit        Other    COVID-19    Relevant Medications    predniSONE 20 mg tablet      Other Visit Diagnoses     Encounter for screening mammogram for malignant neoplasm of breast    -  Primary    Relevant Orders    Mammo screening bilateral w 3d & cad         Disposition:     Patient has asymptomatic or mild COVID-19 infection  Based off CDC guidelines, they were recommended to isolate for 5 days  If they are asymptomatic or symptoms are improving with no fevers in the past 24 hours, isolation may be ended followed by 5 days of wearing a mask when around othes to minimize risk of infecting others  If still have a fever or other symptoms have not improved, continue to isolate until they improve  Regardless of when they end isolation, avoid being around people who are more likely to get very sick from COVID-19 until at least day 11  Continue inhalers, start prednisone  Out of window for Paxlovid  Rest and fluids advised  Educated that the course of this illness could be 2-4 weeks  Discussed symptomatic relief, such as warm steam inhalations, tylenol/ibuprofen for fevers and body aches, rest, and drink plenty of fluids  Warm salt gargles for sore throat  Discussed red flag signs to go to the ER, such as chest pain or shortness of breath  Return to the office for reevaluation if symptoms worsen or do not improve in 1-2 weeks       I have spent 15 minutes directly with the patient  Encounter provider: ARYA Hunter     Provider located at: [address] [address]     Recent Visits  No visits were found meeting these conditions    Showing recent visits within past 7 days and meeting all other requirements  Today's Visits  Date Type Provider Dept   10/21/22 [facility] [address] today's visits and meeting all other requirements  Future Appointments  No visits were found meeting these conditions  Showing future appointments within next 150 days and meeting all other requirements     This virtual check-in was done via [employer] and patient was informed that this is a secure, HIPAA-compliant platform  She agrees to proceed  Patient agrees to participate in a virtual check in via telephone or video visit instead of presenting to the office to address urgent/immediate medical needs  Patient is aware this is a billable service  She acknowledged consent and understanding of privacy and security of the video platform  The patient has agreed to participate and understands they can discontinue the visit at any time  After connecting through [facility], the patient was identified by name and date of birth  Bradley Wells was informed that this was a telemedicine visit and that the exam was being conducted confidentially over secure lines  My office door was closed  No one else was in the room  Bradley Wells acknowledged consent and understanding of privacy and security of the telemedicine visit  I informed the patient that I have reviewed her record in Epic and presented the opportunity for her to ask any questions regarding the visit today  The patient agreed to participate  Verification of patient location:  Patient is located in the following state in which I hold an active license: [place]    Subjective:   Bradley Wells is a 57 y o  female who has been screened for COVID-19  Symptom change since last report: worsening  Patient's symptoms include nasal congestion, rhinorrhea, sore throat (mild), cough, shortness of breath and chest tightness  Patient denies fever, chills, fatigue, malaise, anosmia, loss of taste, abdominal pain, nausea, vomiting, diarrhea, myalgias and headaches       - Date of symptom onset: 10/15/2022  - Date of positive COVID-19 test: 10/21/2022  Type of test: Home antigen  COVID-19 vaccination status: Fully vaccinated with booster    Emeka Rossi has been staying home and has isolated themselves in her home  She is taking care to not share personal items and is cleaning all surfaces that are touched often, like counters, tabletops, and doorknobs using household cleaning sprays or wipes  She is wearing a mask when she leaves her room  She reports that she is short of breath however has been using her in haler and feels better  No results found for: Dana Ronde, SARSCORONAVI, CORONAVIRUSR, SARSCOVAG, [facility]    Review of Systems   Constitutional: Negative for activity change, appetite change, chills, diaphoresis, fatigue and fever  HENT: Positive for congestion, rhinorrhea and sore throat (mild)  Negative for ear discharge, ear pain, postnasal drip, sinus pressure and sinus pain  Eyes: Negative for pain, discharge, itching and visual disturbance  Respiratory: Positive for cough, chest tightness and shortness of breath  Negative for wheezing  Cardiovascular: Negative for chest pain, palpitations and leg swelling  Gastrointestinal: Negative for abdominal pain, constipation, diarrhea, nausea and vomiting  Endocrine: Negative for polydipsia, polyphagia and polyuria  Genitourinary: Negative for difficulty urinating, dysuria and urgency  Musculoskeletal: Negative for arthralgias, back pain, myalgias and neck pain  Skin: Negative for rash and wound  Neurological: Negative for dizziness, weakness, numbness and headaches       Current Outpatient Medications on File Prior to Visit   Medication Sig   • albuterol (PROVENTIL HFA,VENTOLIN HFA) 90 mcg/act inhaler Inhale 2 puffs 4 (four) times a day   • Ascorbic Acid (VITAMIN C GUMMIE PO) Take 1 tablet by mouth daily   • cholecalciferol (VITAMIN D3) 1,000 units tablet Take 1,000 Units by mouth daily   • Coenzyme Q10 (COQ10) 100 MG CAPS Take 1 capsule by mouth daily   • ELDERBERRY PO Take 1 capsule by mouth daily    • fluticasone (FLONASE) 50 mcg/act nasal spray 2 sprays into each nostril daily   • fluticasone-salmeterol (Advair) 250-50 mcg/dose inhaler Inhale 1 puff 2 (two) times a day Rinse mouth after use  • Vitamin Mixture (VITAMIN E COMPLETE PO) Take by mouth 267 mg  one pill per day   • albuterol (2 5 mg/3 mL) 0 083 % nebulizer solution Take 2 5 mg by nebulization every 6 (six) hours as needed for wheezing (Patient not taking: No sig reported)   • rhpu-bdxvhbm-vdm-hydrocort (CORTISPORIN) 1 % ointment Administer to both eyes every 4 (four) hours (Patient not taking: No sig reported)   • benzonatate (TESSALON PERLES) 100 mg capsule Take 1 capsule (100 mg total) by mouth as needed in the morning and 1 capsule (100 mg total) as needed at noon and 1 capsule (100 mg total) as needed in the evening for cough  (Patient not taking: No sig reported)   • fexofenadine (ALLEGRA) 180 MG tablet Take 1 tablet (180 mg total) by mouth daily (Patient not taking: No sig reported)       Objective: There were no vitals taken for this visit  Physical Exam  Constitutional:       Appearance: Normal appearance  She is not ill-appearing  Eyes:      General: No scleral icterus  Pulmonary:      Effort: No respiratory distress  Neurological:      Mental Status: She is alert     Psychiatric:         Mood and Affect: Mood normal          Behavior: Behavior normal        ARYA Kenyon

## 2022-11-29 ENCOUNTER — OFFICE VISIT (OUTPATIENT)
Dept: INTERNAL MEDICINE CLINIC | Age: 62
End: 2022-11-29

## 2022-11-29 VITALS
BODY MASS INDEX: 23.92 KG/M2 | SYSTOLIC BLOOD PRESSURE: 108 MMHG | TEMPERATURE: 97.4 F | WEIGHT: 135 LBS | HEIGHT: 63 IN | OXYGEN SATURATION: 99 % | HEART RATE: 67 BPM | DIASTOLIC BLOOD PRESSURE: 76 MMHG

## 2022-11-29 DIAGNOSIS — Z91.09 ENVIRONMENTAL ALLERGIES: ICD-10-CM

## 2022-11-29 DIAGNOSIS — H00.015 HORDEOLUM EXTERNUM OF LEFT LOWER EYELID: Primary | ICD-10-CM

## 2022-11-29 RX ORDER — FEXOFENADINE HCL 180 MG/1
180 TABLET ORAL DAILY
Qty: 90 TABLET | Refills: 1 | Status: SHIPPED | OUTPATIENT
Start: 2022-11-29

## 2022-11-29 NOTE — PROGRESS NOTES
INTERNAL MEDICINE OFFICE VISIT    NAME: Layla Kilgore  AGE: 57 y o  SEX: female    DATE OF ENCOUNTER: 11/29/2022    Assessment and Plan     1  Environmental allergies  Requested refill    - fexofenadine (ALLEGRA) 180 MG tablet; Take 1 tablet (180 mg total) by mouth daily  Dispense: 90 tablet; Refill: 1    2  Hordeolum externum of left lower eyelid  Patient has L lower eyelid lesion with mild white drainage from swollen lesion  Lasted 1 week and has not tried any medications  No systemic symptoms  Likely has additional component of allergic conjunctivitis at baseline -- on zyrtec at home    - Recommend frequent warm compresses  - dpwb-nuxwdxv-vtk-hydrocort (CORTISPORIN) 1 % ointment; Administer into the left eye every 4 (four) hours  Dispense: 3 5 g; Refill: 0  - If no improvement in 5-7 days, recommend patient to see ophthalmologist  No orders of the defined types were placed in this encounter       - Counseling Documentation: patient was counseled regarding: diagnostic results and instructions for management    Chief Complaint     Chief Complaint   Patient presents with   • Eye Problem     LEFT EYE- started over a week ago- noticeable redness and swelling around eye  Discharge from eye  Possible ulcer  Pt states does take care of chickens at home and cleans up so unsure if something got into eye       History of Present Illness     HPI   Patient with history of seasonal allergies, allergic rhinitis, asthma, blepharitis,     One week ago she had a lump under her lower L eyelid which has increasingly progressed and states it looks ulcerated  She thinks something got into her eye and she often takes care of chicken, cats, dogs at home as 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)  She does weed-wacking and a lot of things "fly" into her eye  She experiences swelling around the upper lid and significant itching of upper and lower left eye lids   She does feel pressure and soreness around the left eye  At nighttime, she has some "white goo" that forms around her eyes and some crusting on the left eye  No other discharge  She did have history of blepharitis of upper eyelids in 3/2022 but she feels that this time is different  She has tried to avoid use of makeup, soap in water  She has not used any new creams or makeup -- normally she wears mascara and moisturizer but has not changed brands  Has not tried any medications or warm compresses  Patient takes flonase daily in the morning and zyrtec as needed  The following portions of the patient's history were reviewed and updated as appropriate: allergies, current medications, past family history, past medical history, past social history, past surgical history and problem list     Review of Systems     Review of Systems    Active Problem List     Patient Active Problem List   Diagnosis   • Mild persistent asthma   • Screening for colon cancer   • Bradycardia   • Essential hypertension   • Immunization counseling   • Right ankle sprain   • Positive colorectal cancer screening using Cologuard test   • Tick bites   • Poison ivy dermatitis   • 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   • Environmental allergies   • COVID-19       Objective     /76 (BP Location: Left arm, Patient Position: Sitting, Cuff Size: Standard)   Pulse 67   Temp (!) 97 4 °F (36 3 °C) (Temporal)   Ht 5' 3" (1 6 m)   Wt 61 2 kg (135 lb)   SpO2 99% Comment: room air  BMI 23 91 kg/m²     Physical Exam  Vitals reviewed  Constitutional:       General: She is not in acute distress  Appearance: Normal appearance  She is normal weight  She is not ill-appearing  HENT:      Head: Normocephalic and atraumatic  Nose: Nose normal  No congestion  Mouth/Throat:      Mouth: Mucous membranes are moist       Pharynx: Oropharynx is clear  No oropharyngeal exudate  Eyes:      General: No scleral icterus  Right eye: No discharge           Left eye: Discharge present  Extraocular Movements: Extraocular movements intact  Conjunctiva/sclera: Conjunctivae normal       Comments: Left lower lid lesion - erythematous with white drainage   Swelling of left eyelid, non-tender  Mild conjunctival injection bilateral  B/l pupils reactive equal       Neck:      Comments: Left auricular adenopathy  Cardiovascular:      Rate and Rhythm: Normal rate  Pulses: Normal pulses  Pulmonary:      Effort: Pulmonary effort is normal    Abdominal:      General: Abdomen is flat  Musculoskeletal:         General: No swelling  Normal range of motion  Cervical back: Normal range of motion and neck supple  Skin:     General: Skin is warm and dry  Capillary Refill: Capillary refill takes less than 2 seconds  Neurological:      General: No focal deficit present  Mental Status: She is alert and oriented to person, place, and time        Gait: Gait normal    Psychiatric:         Mood and Affect: Mood normal          Pertinent Laboratory/Diagnostic Studies:  CBC:   Lab Results   Component Value Date/Time    WBC 4 78 01/25/2022 07:36 AM    RBC 5 41 (H) 01/25/2022 07:36 AM    HGB 16 1 (H) 01/25/2022 07:36 AM    HCT 47 6 (H) 01/25/2022 07:36 AM    MCV 88 01/25/2022 07:36 AM    MCH 29 8 01/25/2022 07:36 AM    MCHC 33 8 01/25/2022 07:36 AM    RDW 11 9 01/25/2022 07:36 AM    MPV 10 1 01/25/2022 07:36 AM     01/25/2022 07:36 AM    NRBC 0 01/25/2022 07:36 AM    NEUTOPHILPCT 49 01/25/2022 07:36 AM    LYMPHOPCT 37 01/25/2022 07:36 AM    MONOPCT 8 01/25/2022 07:36 AM    EOSPCT 5 01/25/2022 07:36 AM    BASOPCT 1 01/25/2022 07:36 AM    NEUTROABS 2 34 01/25/2022 07:36 AM    LYMPHSABS 1 77 01/25/2022 07:36 AM    MONOSABS 0 40 01/25/2022 07:36 AM    EOSABS 0 22 01/25/2022 07:36 AM     Chemistry Profile:   Lab Results   Component Value Date/Time    K 4 5 01/25/2022 07:36 AM     01/25/2022 07:36 AM    CO2 30 01/25/2022 07:36 AM    BUN 16 01/25/2022 07:36 AM CREATININE 0 92 01/25/2022 07:36 AM    GLUF 76 01/25/2022 07:36 AM    CALCIUM 9 7 01/25/2022 07:36 AM    AST 38 01/25/2022 07:36 AM    ALT 54 01/25/2022 07:36 AM    ALKPHOS 100 01/25/2022 07:36 AM    EGFR 66 01/25/2022 07:36 AM     CBC:   Results from Last 12 Months   Lab Units 01/25/22  0736   WBC Thousand/uL 4 78   RBC Million/uL 5 41*   HEMOGLOBIN g/dL 16 1*   HEMATOCRIT % 47 6*   MCV fL 88   MCH pg 29 8   MCHC g/dL 33 8   RDW % 11 9   MPV fL 10 1   PLATELETS Thousands/uL 228   NRBC AUTO /100 WBCs 0   NEUTROS PCT % 49   LYMPHS PCT % 37   MONOS PCT % 8   EOS PCT % 5   BASOS PCT % 1   NEUTROS ABS Thousands/µL 2 34   LYMPHS ABS Thousands/µL 1 77   MONOS ABS Thousand/µL 0 40   EOS ABS Thousand/µL 0 22     Chemistry Profile:   Results from Last 12 Months   Lab Units 01/25/22  0736   POTASSIUM mmol/L 4 5   CHLORIDE mmol/L 108   CO2 mmol/L 30   BUN mg/dL 16   CREATININE mg/dL 0 92   GLUCOSE FASTING mg/dL 76   CALCIUM mg/dL 9 7   AST U/L 38   ALT U/L 54   ALK PHOS U/L 100   EGFR ml/min/1 73sq m 66         Current Medications     Current Outpatient Medications:   •  albuterol (PROVENTIL HFA,VENTOLIN HFA) 90 mcg/act inhaler, Inhale 2 puffs 4 (four) times a day, Disp: 8 5 g, Rfl: 2  •  Ascorbic Acid (VITAMIN C GUMMIE PO), Take 1 tablet by mouth daily, Disp: , Rfl:   •  cholecalciferol (VITAMIN D3) 1,000 units tablet, Take 1,000 Units by mouth daily, Disp: , Rfl:   •  Coenzyme Q10 (COQ10) 100 MG CAPS, Take 1 capsule by mouth daily, Disp: , Rfl:   •  ELDERBERRY PO, Take 1 capsule by mouth daily , Disp: , Rfl:   •  fluticasone (FLONASE) 50 mcg/act nasal spray, 2 sprays into each nostril daily, Disp: 18 2 mL, Rfl: 5  •  fluticasone-salmeterol (Advair) 250-50 mcg/dose inhaler, Inhale 1 puff 2 (two) times a day Rinse mouth after use , Disp: 60 blister, Rfl: 3  •  Vitamin Mixture (VITAMIN E COMPLETE PO), Take by mouth 267 mg  one pill per day, Disp: , Rfl:   •  albuterol (2 5 mg/3 mL) 0 083 % nebulizer solution, Take 2 5 mg by nebulization every 6 (six) hours as needed for wheezing, Disp: , Rfl:   •  iljd-hsgivrd-gqi-hydrocort (CORTISPORIN) 1 % ointment, Administer to both eyes every 4 (four) hours, Disp: 3 5 g, Rfl: 0  •  benzonatate (TESSALON PERLES) 100 mg capsule, Take 1 capsule (100 mg total) by mouth as needed in the morning and 1 capsule (100 mg total) as needed at noon and 1 capsule (100 mg total) as needed in the evening for cough   (Patient not taking: Reported on 10/21/2022), Disp: 30 capsule, Rfl: 0  •  fexofenadine (ALLEGRA) 180 MG tablet, Take 1 tablet (180 mg total) by mouth daily (Patient not taking: Reported on 10/21/2022), Disp: 90 tablet, Rfl: 1    Health Maintenance     Health Maintenance   Topic Date Due   • Hepatitis B Vaccine (1 of 3 - 3-dose series) Never done   • COVID-19 Vaccine (1) Never done   • HIV Screening  Never done   • Annual Physical  Never done   • Cervical Cancer Screening  Never done   • Pneumococcal Vaccine: Pediatrics (0 to 5 Years) and At-Risk Patients (6 to 59 Years) (2 - PCV) 04/18/2017   • Influenza Vaccine (1) 09/01/2022   • Breast Cancer Screening: Mammogram  11/02/2022   • BMI: Adult  03/14/2023   • Depression Screening  10/21/2023   • Colorectal Cancer Screening  07/06/2024   • DTaP,Tdap,and Td Vaccines (2 - Td or Tdap) 04/18/2026   • Hepatitis C Screening  Completed   • HIB Vaccine  Aged Out   • IPV Vaccine  Aged Out   • Hepatitis A Vaccine  Aged Out   • Meningococcal ACWY Vaccine  Aged Out   • HPV Vaccine  Aged Out     Immunization History   Administered Date(s) Administered   • INFLUENZA 08/19/2014, 08/12/2017, 12/06/2018, 11/04/2021, 11/04/2021   • Influenza, injectable, quadrivalent, preservative free 0 5 mL 09/30/2019   • Pneumococcal Polysaccharide PPV23 04/18/2016   • Tdap 04/18/2016           Branden Hernandez MD  Internal Medicine  PGY-2  11/29/2022 10:17 AM

## 2022-11-29 NOTE — PATIENT INSTRUCTIONS
- please do warm compresses multiples times daily  - please apply eye ointment  - if your eyelid does not improve in 5-7 days please visit an ophthalmologist

## 2022-12-01 ENCOUNTER — PROBLEM (OUTPATIENT)
Dept: URBAN - METROPOLITAN AREA CLINIC 6 | Facility: CLINIC | Age: 62
End: 2022-12-01

## 2022-12-01 DIAGNOSIS — H00.15: ICD-10-CM

## 2022-12-01 DIAGNOSIS — H25.13: ICD-10-CM

## 2022-12-01 PROCEDURE — 92002 INTRM OPH EXAM NEW PATIENT: CPT

## 2022-12-01 ASSESSMENT — VISUAL ACUITY
OS_SC: 20/60+1
OD_SC: 20/50-1
OS_PH: 20/30
OD_PH: 20/25

## 2022-12-01 ASSESSMENT — TONOMETRY
OD_IOP_MMHG: 10
OS_IOP_MMHG: 11

## 2022-12-01 ASSESSMENT — KERATOMETRY
OS_AXISANGLE2_DEGREES: 91
OS_K2POWER_DIOPTERS: 44.50
OS_AXISANGLE_DEGREES: 1
OD_AXISANGLE2_DEGREES: 78
OD_K1POWER_DIOPTERS: 43.75
OS_K1POWER_DIOPTERS: 44.00
OD_K2POWER_DIOPTERS: 44.75
OD_AXISANGLE_DEGREES: 168

## 2022-12-06 ENCOUNTER — PROBLEM (OUTPATIENT)
Dept: URBAN - METROPOLITAN AREA CLINIC 6 | Facility: CLINIC | Age: 62
End: 2022-12-06

## 2022-12-06 DIAGNOSIS — H00.15: ICD-10-CM

## 2022-12-06 DIAGNOSIS — H11.32: ICD-10-CM

## 2022-12-06 PROCEDURE — 92012 INTRM OPH EXAM EST PATIENT: CPT

## 2022-12-06 ASSESSMENT — KERATOMETRY
OS_AXISANGLE2_DEGREES: 91
OD_AXISANGLE2_DEGREES: 78
OD_K2POWER_DIOPTERS: 44.75
OS_K1POWER_DIOPTERS: 44.00
OD_K1POWER_DIOPTERS: 43.75
OD_AXISANGLE_DEGREES: 168
OS_AXISANGLE_DEGREES: 1
OS_K2POWER_DIOPTERS: 44.50

## 2022-12-06 ASSESSMENT — TONOMETRY
OD_IOP_MMHG: 10
OS_IOP_MMHG: 12

## 2022-12-06 ASSESSMENT — VISUAL ACUITY
OD_PH: 20/30+2
OD_SC: 20/50
OS_SC: 20/30-2

## 2022-12-16 ENCOUNTER — FOLLOW UP (OUTPATIENT)
Dept: URBAN - METROPOLITAN AREA CLINIC 6 | Facility: CLINIC | Age: 62
End: 2022-12-16

## 2022-12-16 DIAGNOSIS — H00.15: ICD-10-CM

## 2022-12-16 DIAGNOSIS — H11.32: ICD-10-CM

## 2022-12-16 PROCEDURE — 92012 INTRM OPH EXAM EST PATIENT: CPT

## 2022-12-16 ASSESSMENT — KERATOMETRY
OS_AXISANGLE_DEGREES: 1
OD_K1POWER_DIOPTERS: 43.75
OS_AXISANGLE2_DEGREES: 91
OD_AXISANGLE2_DEGREES: 78
OS_K2POWER_DIOPTERS: 44.50
OD_AXISANGLE_DEGREES: 168
OS_K1POWER_DIOPTERS: 44.00
OD_K2POWER_DIOPTERS: 44.75

## 2022-12-16 ASSESSMENT — VISUAL ACUITY
OD_SC: 20/50
OD_PH: 20/30
OS_SC: 20/30

## 2022-12-16 ASSESSMENT — TONOMETRY
OD_IOP_MMHG: 10
OS_IOP_MMHG: 13

## 2023-03-29 ENCOUNTER — FOLLOW UP (OUTPATIENT)
Dept: URBAN - METROPOLITAN AREA CLINIC 6 | Facility: CLINIC | Age: 63
End: 2023-03-29

## 2023-03-29 DIAGNOSIS — H25.13: ICD-10-CM

## 2023-03-29 DIAGNOSIS — H04.123: ICD-10-CM

## 2023-03-29 PROCEDURE — 92014 COMPRE OPH EXAM EST PT 1/>: CPT

## 2023-03-30 ASSESSMENT — KERATOMETRY
OD_AXISANGLE2_DEGREES: 78
OD_K2POWER_DIOPTERS: 44.75
OS_K1POWER_DIOPTERS: 44.00
OS_K2POWER_DIOPTERS: 44.50
OD_AXISANGLE_DEGREES: 168
OS_AXISANGLE2_DEGREES: 91
OS_AXISANGLE_DEGREES: 1
OD_K1POWER_DIOPTERS: 43.75

## 2023-03-30 ASSESSMENT — VISUAL ACUITY
OD_SC: 20/50
OS_SC: 20/30-1

## 2023-03-30 ASSESSMENT — TONOMETRY
OD_IOP_MMHG: 15
OS_IOP_MMHG: 16

## 2023-05-19 ENCOUNTER — TELEPHONE (OUTPATIENT)
Dept: OBGYN CLINIC | Facility: CLINIC | Age: 63
End: 2023-05-19

## 2023-05-19 NOTE — TELEPHONE ENCOUNTER
Caller: Mother    Doctor: n/a    Reason for call: Calling to be seen for Comminuted distal intra-articular fracture radius  Spoke with hand scheduling team and could offer possibly Tuesday but nothing available for today      Will call back if needed    Call back#: n/a

## 2023-06-07 DIAGNOSIS — Z91.09 ENVIRONMENTAL ALLERGIES: ICD-10-CM

## 2023-06-07 DIAGNOSIS — J45.30 MILD PERSISTENT ASTHMA WITHOUT COMPLICATION: ICD-10-CM

## 2023-06-07 DIAGNOSIS — J45.909 UNCOMPLICATED ASTHMA, UNSPECIFIED ASTHMA SEVERITY, UNSPECIFIED WHETHER PERSISTENT: ICD-10-CM

## 2023-06-07 RX ORDER — FLUTICASONE PROPIONATE 50 MCG
2 SPRAY, SUSPENSION (ML) NASAL DAILY
Qty: 18.2 ML | Refills: 5 | Status: SHIPPED | OUTPATIENT
Start: 2023-06-07

## 2023-06-07 RX ORDER — FLUTICASONE PROPIONATE AND SALMETEROL 250; 50 UG/1; UG/1
1 POWDER RESPIRATORY (INHALATION) 2 TIMES DAILY
Qty: 60 BLISTER | Refills: 0 | Status: SHIPPED | OUTPATIENT
Start: 2023-06-07

## 2023-06-07 RX ORDER — ALBUTEROL SULFATE 90 UG/1
2 AEROSOL, METERED RESPIRATORY (INHALATION) 4 TIMES DAILY
Qty: 8.5 G | Refills: 2 | Status: SHIPPED | OUTPATIENT
Start: 2023-06-07

## 2023-06-07 RX ORDER — FEXOFENADINE HCL 180 MG/1
180 TABLET ORAL DAILY
Qty: 90 TABLET | Refills: 0 | Status: SHIPPED | OUTPATIENT
Start: 2023-06-07

## 2023-07-20 ENCOUNTER — RA CDI HCC (OUTPATIENT)
Dept: OTHER | Facility: HOSPITAL | Age: 63
End: 2023-07-20

## 2023-07-20 NOTE — PROGRESS NOTES
[address] [facility] coding opportunities          Chart Reviewed number of suggestions sent to Provider: 1   J45.30    Patients Insurance        Commercial Insurance: [employer]

## 2023-07-31 ENCOUNTER — OFFICE VISIT (OUTPATIENT)
Dept: INTERNAL MEDICINE CLINIC | Age: 63
End: 2023-07-31
Payer: COMMERCIAL

## 2023-07-31 VITALS
SYSTOLIC BLOOD PRESSURE: 128 MMHG | DIASTOLIC BLOOD PRESSURE: 80 MMHG | BODY MASS INDEX: 23.88 KG/M2 | OXYGEN SATURATION: 97 % | HEART RATE: 66 BPM | TEMPERATURE: 98.2 F | WEIGHT: 134.8 LBS | HEIGHT: 63 IN

## 2023-07-31 DIAGNOSIS — J45.30 MILD PERSISTENT ASTHMA WITHOUT COMPLICATION: ICD-10-CM

## 2023-07-31 DIAGNOSIS — J45.909 UNCOMPLICATED ASTHMA, UNSPECIFIED ASTHMA SEVERITY, UNSPECIFIED WHETHER PERSISTENT: ICD-10-CM

## 2023-07-31 DIAGNOSIS — Z78.0 MENOPAUSE: Primary | ICD-10-CM

## 2023-07-31 DIAGNOSIS — Z91.09 ENVIRONMENTAL ALLERGIES: ICD-10-CM

## 2023-07-31 DIAGNOSIS — Z13.220 SCREENING, LIPID: ICD-10-CM

## 2023-07-31 PROCEDURE — 3288F FALL RISK ASSESSMENT DOCD: CPT | Performed by: INTERNAL MEDICINE

## 2023-07-31 PROCEDURE — 99214 OFFICE O/P EST MOD 30 MIN: CPT | Performed by: INTERNAL MEDICINE

## 2023-07-31 PROCEDURE — 1100F PTFALLS ASSESS-DOCD GE2>/YR: CPT | Performed by: INTERNAL MEDICINE

## 2023-07-31 RX ORDER — ALBUTEROL SULFATE 90 UG/1
2 AEROSOL, METERED RESPIRATORY (INHALATION) 4 TIMES DAILY
Qty: 8.5 G | Refills: 2 | Status: SHIPPED | OUTPATIENT
Start: 2023-07-31

## 2023-07-31 RX ORDER — FLUTICASONE PROPIONATE 50 MCG
2 SPRAY, SUSPENSION (ML) NASAL DAILY
Qty: 18.2 ML | Refills: 5 | Status: SHIPPED | OUTPATIENT
Start: 2023-07-31

## 2023-07-31 RX ORDER — FEXOFENADINE HCL 180 MG/1
180 TABLET ORAL DAILY
Qty: 90 TABLET | Refills: 0 | Status: SHIPPED | OUTPATIENT
Start: 2023-07-31

## 2023-07-31 RX ORDER — FLUTICASONE PROPIONATE AND SALMETEROL 250; 50 UG/1; UG/1
1 POWDER RESPIRATORY (INHALATION) 2 TIMES DAILY
Qty: 60 BLISTER | Refills: 0 | Status: SHIPPED | OUTPATIENT
Start: 2023-07-31

## 2023-07-31 NOTE — ASSESSMENT & PLAN NOTE
DEXA ordered  Encouraged 1200 mg calcium daily in divided doses and vit D 1000 IU daily  Weight bearing exercise

## 2023-07-31 NOTE — ASSESSMENT & PLAN NOTE
Doing well  Cont advair and allegra daily  Albuterol HFA prn  Avoid triggers  Had PPSV 23 vaccine in the past  Call if increase in freq/severity of symptoms  F/u in 6 months

## 2023-07-31 NOTE — PROGRESS NOTES
Assessment/Plan:    Mild persistent asthma  Doing well  Cont advair and allegra daily  Albuterol HFA prn  Avoid triggers  Had PPSV 23 vaccine in the past  Call if increase in freq/severity of symptoms  F/u in 6 months    Environmental allergies  Cont allegra and flonase  Avoid triggers  Call if symptoms worsen    Menopause  DEXA ordered  Encouraged 1200 mg calcium daily in divided doses and vit D 1000 IU daily  Weight bearing exercise       Diagnoses and all orders for this visit:    Menopause  -     DXA bone density spine hip and pelvis; Future    Environmental allergies  -     fluticasone (FLONASE) 50 mcg/act nasal spray; 2 sprays into each nostril daily  -     fexofenadine (ALLEGRA) 180 MG tablet; Take 1 tablet (180 mg total) by mouth daily    Mild persistent asthma without complication  -     Fluticasone-Salmeterol (Advair) 250-50 mcg/dose inhaler; Inhale 1 puff 2 (two) times a day Rinse mouth after use. -     CBC and differential; Future  -     Comprehensive metabolic panel; Future    Uncomplicated asthma, unspecified asthma severity, unspecified whether persistent  -     albuterol (PROVENTIL HFA,VENTOLIN HFA) 90 mcg/act inhaler; Inhale 2 puffs 4 (four) times a day    Screening, lipid  -     Lipid panel; Future            Depression Screening and Follow-up Plan: Patient was screened for depression during today's encounter. They screened negative with a PHQ-2 score of 0. Falls Plan of Care: balance, strength, and gait training instructions were provided. Vitamin D supplementation was recommended. Her fall was from pulling to hard and falling backward. Subjective:          Patient ID: Ellie Dumont is a 70 y.o. female. She feels her asthma is well controlled. Using advair and allegra daily and albuterol 2-3 times per week. She tends to need the albuterol when she is "in the woods" or around a campfire.      She was working and "pulling on something too hard" She fell back and had a left [facility] injury. She is right handed. She had ORIF of the left wrist.  Following with the surgeon and PT. She still feels the wrist is weak. Asthma  There is no chest tightness, cough, difficulty breathing, frequent throat clearing, hemoptysis, hoarse voice, shortness of breath, sputum production or wheezing. This is a chronic problem. The current episode started more than 1 year ago. The problem occurs intermittently. The problem has been unchanged. Associated symptoms include dyspnea on exertion. Pertinent negatives include no appetite change, chest pain, fever, headaches, heartburn, nasal congestion, postnasal drip, rhinorrhea, sneezing, sore throat or trouble swallowing. Her symptoms are aggravated by exposure to smoke and pollen. Her symptoms are alleviated by beta-agonist. She reports complete improvement on treatment. Her past medical history is significant for asthma. The following portions of the patient's history were reviewed and updated as appropriate: past family history, past surgical history and problem list.    Review of Systems   Constitutional: Negative for appetite change, chills, fatigue and fever. HENT: Negative for congestion, hoarse voice, postnasal drip, rhinorrhea, sneezing, sore throat and trouble swallowing. Eyes: Negative for visual disturbance. Respiratory: Negative for cough, hemoptysis, sputum production, shortness of breath and wheezing. Cardiovascular: Positive for dyspnea on exertion. Negative for chest pain, palpitations and leg swelling. Gastrointestinal: Negative for abdominal pain, constipation, diarrhea, heartburn, nausea and vomiting. Neurological: Negative for dizziness and headaches. Psychiatric/Behavioral: Negative for dysphoric mood. The patient is not nervous/anxious.           Past Medical History:   Diagnosis Date   • Asthma    • Stroke [facility]) 2001         Current Outpatient Medications:   •  albuterol (PROVENTIL HFA,VENTOLIN HFA) 90 mcg/act inhaler, Inhale 2 puffs 4 (four) times a day, Disp: 8.5 g, Rfl: 2  •  Ascorbic Acid (VITAMIN C GUMMIE PO), Take 1 tablet by mouth daily, Disp: , Rfl:   •  cholecalciferol (VITAMIN D3) 1,000 units tablet, Take 1,000 Units by mouth daily, Disp: , Rfl:   •  Coenzyme Q10 (COQ10) 100 MG CAPS, Take 1 capsule by mouth daily, Disp: , Rfl:   •  ELDERBERRY PO, Take 1 capsule by mouth daily , Disp: , Rfl:   •  fexofenadine (ALLEGRA) 180 MG tablet, Take 1 tablet (180 mg total) by mouth daily, Disp: 90 tablet, Rfl: 0  •  fluticasone (FLONASE) 50 mcg/act nasal spray, 2 sprays into each nostril daily, Disp: 18.2 mL, Rfl: 5  •  Fluticasone-Salmeterol (Advair) 250-50 mcg/dose inhaler, Inhale 1 puff 2 (two) times a day Rinse mouth after use., Disp: 60 blister, Rfl: 0  •  Vitamin Mixture (VITAMIN E COMPLETE PO), Take by mouth 267 mg  one pill per day, Disp: , Rfl:   •  albuterol (2.5 mg/3 mL) 0.083 % nebulizer solution, Take 2.5 mg by nebulization every 6 (six) hours as needed for wheezing (Patient not taking: Reported on 7/31/2023), Disp: , Rfl:     Allergies   Allergen Reactions   • Mayonnaise Hives   • Mold Extract [Trichophyton] Other (See Comments)     Asthma     • Other Eye Swelling     Dogs, cats, leaves - pt has asthma   • Dust Mite Extract Cough     asthma       Social History   History reviewed. No pertinent surgical history.   Family History   Problem Relation Age of Onset   • Cancer Father    • Cancer Brother    • No Known Problems Mother    • No Known Problems Sister    • No Known Problems Maternal Grandmother    • No Known Problems Maternal Grandfather    • No Known Problems Paternal Grandmother    • No Known Problems Paternal Grandfather    • No Known Problems Sister    • No Known Problems Son    • No Known Problems Maternal Aunt    • No Known Problems Paternal Aunt        Health Maintenance   Topic Date Due   • COVID-19 Vaccine (1) Never done   • HIV Screening  Never done   • Annual Physical  Never done   • Osteoporosis Screening  Never done   • Pneumococcal Vaccine: 65+ Years (2 - PCV) 04/18/2017   • Breast Cancer Screening: Mammogram  11/02/2022   • Influenza Vaccine (1) 09/01/2023   • Colorectal Cancer Screening  07/06/2024   • Fall Risk  07/31/2024   • Depression Screening  07/31/2024   • Urinary Incontinence Screening  07/31/2024   • BMI: Adult  07/31/2024   • Falls: Plan of Care  07/31/2024   • DTaP,Tdap,and Td Vaccines (2 - Td or Tdap) 04/18/2026   • Hepatitis C Screening  Completed   • HIB Vaccine  Aged Out   • IPV Vaccine  Aged Out   • Hepatitis A Vaccine  Aged Out   • Meningococcal ACWY Vaccine  Aged Out   • HPV Vaccine  Aged Out     Immunization History   Administered Date(s) Administered   • INFLUENZA 08/19/2014, 08/12/2017, 12/06/2018, 11/04/2021, 11/04/2021   • Influenza, injectable, quadrivalent, preservative free 0.5 mL 09/30/2019   • Pneumococcal Polysaccharide PPV23 04/18/2016   • Tdap 04/18/2016         Objective:  /80 (BP Location: Left arm, Patient Position: Sitting, Cuff Size: Standard)   Pulse 66   Temp 98.2 °F (36.8 °C) (Temporal)   Ht 5' 3" (1.6 m)   Wt 61.1 kg (134 lb 12.8 oz)   SpO2 97%   BMI 23.88 kg/m²   Body mass index is 23.88 kg/m². Physical Exam  Constitutional:       Appearance: She is well-developed. HENT:      Head: Normocephalic and atraumatic. Right Ear: Tympanic membrane, ear canal and external ear normal.      Left Ear: Tympanic membrane, ear canal and external ear normal.      Nose: Nose normal.   Eyes:      Conjunctiva/sclera: Conjunctivae normal.      Pupils: Pupils are equal, round, and reactive to light. Cardiovascular:      Rate and Rhythm: Normal rate and regular rhythm. Heart sounds: Normal heart sounds. Pulmonary:      Effort: Pulmonary effort is normal.      Breath sounds: Normal breath sounds. Musculoskeletal:      Cervical back: Normal range of motion and neck supple. Right lower leg: No edema. Left lower leg: No edema.    Skin: General: Skin is warm and dry. Neurological:      Mental Status: She is alert.

## 2023-08-24 DIAGNOSIS — Z91.09 ENVIRONMENTAL ALLERGIES: ICD-10-CM

## 2023-08-24 RX ORDER — FLUTICASONE PROPIONATE 50 MCG
SPRAY, SUSPENSION (ML) NASAL
Qty: 48 ML | Refills: 2 | OUTPATIENT
Start: 2023-08-24

## 2023-08-29 DIAGNOSIS — J45.30 MILD PERSISTENT ASTHMA WITHOUT COMPLICATION: ICD-10-CM

## 2023-08-29 RX ORDER — FLUTICASONE PROPIONATE AND SALMETEROL 250; 50 UG/1; UG/1
POWDER RESPIRATORY (INHALATION)
OUTPATIENT
Start: 2023-08-29

## 2023-11-15 DIAGNOSIS — J45.30 MILD PERSISTENT ASTHMA WITHOUT COMPLICATION: ICD-10-CM

## 2023-11-15 RX ORDER — FLUTICASONE PROPIONATE AND SALMETEROL 250; 50 UG/1; UG/1
1 POWDER RESPIRATORY (INHALATION) 2 TIMES DAILY
Qty: 60 BLISTER | Refills: 0 | Status: SHIPPED | OUTPATIENT
Start: 2023-11-15

## 2023-12-06 ENCOUNTER — OFFICE VISIT (OUTPATIENT)
Dept: INTERNAL MEDICINE CLINIC | Age: 63
End: 2023-12-06
Payer: MEDICARE

## 2023-12-06 VITALS
TEMPERATURE: 98 F | DIASTOLIC BLOOD PRESSURE: 80 MMHG | BODY MASS INDEX: 24.45 KG/M2 | OXYGEN SATURATION: 97 % | SYSTOLIC BLOOD PRESSURE: 140 MMHG | HEART RATE: 65 BPM | WEIGHT: 138 LBS | HEIGHT: 63 IN

## 2023-12-06 DIAGNOSIS — J01.10 ACUTE NON-RECURRENT FRONTAL SINUSITIS: Primary | ICD-10-CM

## 2023-12-06 DIAGNOSIS — J45.30 MILD PERSISTENT ASTHMA WITHOUT COMPLICATION: ICD-10-CM

## 2023-12-06 PROBLEM — H01.001 BLEPHARITIS OF UPPER EYELIDS OF BOTH EYES: Status: RESOLVED | Noted: 2022-03-14 | Resolved: 2023-12-06

## 2023-12-06 PROBLEM — H01.004 BLEPHARITIS OF UPPER EYELIDS OF BOTH EYES: Status: RESOLVED | Noted: 2022-03-14 | Resolved: 2023-12-06

## 2023-12-06 PROBLEM — U07.1 COVID-19: Status: RESOLVED | Noted: 2022-10-21 | Resolved: 2023-12-06

## 2023-12-06 LAB
SARS-COV-2 AG UPPER RESP QL IA: NEGATIVE
VALID CONTROL: NORMAL

## 2023-12-06 PROCEDURE — 87811 SARS-COV-2 COVID19 W/OPTIC: CPT

## 2023-12-06 PROCEDURE — 99213 OFFICE O/P EST LOW 20 MIN: CPT

## 2023-12-06 RX ORDER — AMOXICILLIN AND CLAVULANATE POTASSIUM 875; 125 MG/1; MG/1
1 TABLET, FILM COATED ORAL EVERY 12 HOURS SCHEDULED
Qty: 20 TABLET | Refills: 0 | Status: SHIPPED | OUTPATIENT
Start: 2023-12-06 | End: 2023-12-16

## 2023-12-06 NOTE — PROGRESS NOTES
Assessment/Plan:    1. Acute non-recurrent frontal sinusitis  Comments:  Symptoms x 10 days  Continue with supportive care as we had discussed  continue with Allegra  Augmentin sent- take with food  Orders:  -     amoxicillin-clavulanate (AUGMENTIN) 875-125 mg per tablet; Take 1 tablet by mouth every 12 (twelve) hours for 10 days    2. Mild persistent asthma without complication  Assessment & Plan:  Cont advair and allegra daily  Albuterol HFA prn  Avoid triggers               Depression Screening and Follow-up Plan: Patient was screened for depression during today's encounter. They screened negative with a PHQ-2 score of 0. [employer] software was used to dictate this note. It may contain errors with dictating incorrect words or incorrect spelling. Please contact the provider directly with any questions. Subjective:      Patient ID: Humphrey Bunch is a 70 y.o. female. Patient is a 68-year-old female presenting to the office with upper respiratory symptoms x 10 days. She has been using her albuterol inhaler twice daily, continue with her daily inhaler, Allegra daily, Flonase daily      Cough  This is a new problem. The current episode started 1 to 4 weeks ago. The cough is Productive of sputum. Associated symptoms include headaches, nasal congestion, postnasal drip, rhinorrhea and a sore throat (improving). Pertinent negatives include no chest pain, chills, ear pain, fever, rash, shortness of breath or wheezing. She has tried a beta-agonist inhaler and steroid inhaler for the symptoms. There is no history of asthma. The following portions of the patient's history were reviewed and updated as appropriate: allergies, current medications, past family history, past medical history, past social history, past surgical history, and problem list.    Review of Systems   Constitutional:  Negative for chills, fatigue and fever.    HENT:  Positive for congestion, postnasal drip, rhinorrhea, sinus pressure and sore throat (improving). Negative for ear pain and trouble swallowing. Eyes:  Negative for pain and visual disturbance. Respiratory:  Positive for cough. Negative for shortness of breath and wheezing. Cardiovascular:  Negative for chest pain, palpitations and leg swelling. Gastrointestinal:  Negative for abdominal pain, nausea and vomiting. Musculoskeletal:  Negative for neck pain and neck stiffness. Skin:  Negative for color change, rash and wound. Neurological:  Positive for headaches. Negative for dizziness, weakness, light-headedness and numbness.          Past Medical History:   Diagnosis Date    Asthma     Stroke ([facility]) 2001         Current Outpatient Medications:     albuterol (2.5 mg/3 mL) 0.083 % nebulizer solution, Take 2.5 mg by nebulization every 6 (six) hours as needed for wheezing, Disp: , Rfl:     albuterol (PROVENTIL HFA,VENTOLIN HFA) 90 mcg/act inhaler, Inhale 2 puffs 4 (four) times a day, Disp: 8.5 g, Rfl: 2    amoxicillin-clavulanate (AUGMENTIN) 875-125 mg per tablet, Take 1 tablet by mouth every 12 (twelve) hours for 10 days, Disp: 20 tablet, Rfl: 0    Ascorbic Acid (VITAMIN C GUMMIE PO), Take 1 tablet by mouth daily, Disp: , Rfl:     cholecalciferol (VITAMIN D3) 1,000 units tablet, Take 1,000 Units by mouth daily, Disp: , Rfl:     Coenzyme Q10 (COQ10) 100 MG CAPS, Take 1 capsule by mouth daily, Disp: , Rfl:     ELDERBERRY PO, Take 1 capsule by mouth daily , Disp: , Rfl:     fexofenadine (ALLEGRA) 180 MG tablet, Take 1 tablet (180 mg total) by mouth daily, Disp: 90 tablet, Rfl: 0    fluticasone (FLONASE) 50 mcg/act nasal spray, 2 sprays into each nostril daily, Disp: 18.2 mL, Rfl: 5    Fluticasone-Salmeterol (Advair) 250-50 mcg/dose inhaler, Inhale 1 puff 2 (two) times a day Rinse mouth after use., Disp: 60 blister, Rfl: 0    Vitamin Mixture (VITAMIN E COMPLETE PO), Take by mouth 267 mg  one pill per day, Disp: , Rfl:     Allergies   Allergen Reactions    Mayonnaise Hives    Mold Extract [Trichophyton] Other (See Comments)     Asthma      Other Eye Swelling     Dogs, cats, leaves - pt has asthma    Dust Mite Extract Cough     asthma       Social History   History reviewed. No pertinent surgical history. Family History   Problem Relation Age of Onset    Cancer Father     Cancer Brother     No Known Problems Mother     No Known Problems Sister     No Known Problems Maternal Grandmother     No Known Problems Maternal Grandfather     No Known Problems Paternal Grandmother     No Known Problems Paternal Grandfather     No Known Problems Sister     No Known Problems Son     No Known Problems Maternal Aunt     No Known Problems Paternal Aunt        Objective:  /80 (BP Location: Left arm, Patient Position: Sitting, Cuff Size: Adult)   Pulse 65   Temp 98 °F (36.7 °C)   Ht 5' 3" (1.6 m)   Wt 62.6 kg (138 lb)   SpO2 97%   BMI 24.45 kg/m²      Physical Exam  Vitals and nursing note reviewed. Constitutional:       General: She is not in acute distress. Appearance: Normal appearance. She is not ill-appearing. HENT:      Head: Normocephalic and atraumatic. Right Ear: Ear canal and external ear normal. No tenderness. A middle ear effusion is present. Left Ear: Ear canal and external ear normal. No tenderness. A middle ear effusion is present. Nose: Congestion present. Right Sinus: Frontal sinus tenderness present. Left Sinus: Frontal sinus tenderness present. Mouth/Throat:      Mouth: Mucous membranes are moist.      Pharynx: Oropharynx is clear. Uvula midline. No pharyngeal swelling, oropharyngeal exudate or posterior oropharyngeal erythema. Tonsils: No tonsillar exudate. Eyes:      General:         Right eye: No discharge. Left eye: No discharge. Extraocular Movements: Extraocular movements intact. Conjunctiva/sclera: Conjunctivae normal.      Pupils: Pupils are equal, round, and reactive to light.    Cardiovascular:      Rate and Rhythm: Normal rate and regular rhythm. Pulses: Normal pulses. Heart sounds: Normal heart sounds. No murmur heard. No friction rub. No gallop. Pulmonary:      Effort: Pulmonary effort is normal. No respiratory distress. Breath sounds: Normal breath sounds. No stridor. No wheezing, rhonchi or rales. Chest:      Chest wall: No tenderness. Musculoskeletal:      Cervical back: Normal range of motion. Lymphadenopathy:      Cervical: No cervical adenopathy. Skin:     General: Skin is warm and dry. Coloration: Skin is not pale. Findings: No erythema. Neurological:      General: No focal deficit present. Mental Status: She is alert and oriented to person, place, and time. Mental status is at baseline. Cranial Nerves: No cranial nerve deficit.       Coordination: Coordination normal.   Psychiatric:         Mood and Affect: Mood normal.         Behavior: Behavior normal.

## 2024-01-04 ENCOUNTER — APPOINTMENT (OUTPATIENT)
Dept: LAB | Age: 64
End: 2024-01-04
Payer: MEDICARE

## 2024-01-04 DIAGNOSIS — J45.30 MILD PERSISTENT ASTHMA WITHOUT COMPLICATION: ICD-10-CM

## 2024-01-04 DIAGNOSIS — Z13.220 SCREENING, LIPID: ICD-10-CM

## 2024-01-04 LAB
ALBUMIN SERPL BCP-MCNC: 4.1 G/DL (ref 3.5–5)
ALP SERPL-CCNC: 84 U/L (ref 34–104)
ALT SERPL W P-5'-P-CCNC: 37 U/L (ref 7–52)
ANION GAP SERPL CALCULATED.3IONS-SCNC: 11 MMOL/L
AST SERPL W P-5'-P-CCNC: 34 U/L (ref 13–39)
BASOPHILS # BLD AUTO: 0.04 THOUSANDS/ÂΜL (ref 0–0.1)
BASOPHILS NFR BLD AUTO: 1 % (ref 0–1)
BILIRUB SERPL-MCNC: 0.87 MG/DL (ref 0.2–1)
BUN SERPL-MCNC: 13 MG/DL (ref 5–25)
CALCIUM SERPL-MCNC: 9.8 MG/DL (ref 8.4–10.2)
CHLORIDE SERPL-SCNC: 103 MMOL/L (ref 96–108)
CHOLEST SERPL-MCNC: 204 MG/DL
CO2 SERPL-SCNC: 28 MMOL/L (ref 21–32)
CREAT SERPL-MCNC: 0.88 MG/DL (ref 0.6–1.3)
EOSINOPHIL # BLD AUTO: 0.28 THOUSAND/ÂΜL (ref 0–0.61)
EOSINOPHIL NFR BLD AUTO: 5 % (ref 0–6)
ERYTHROCYTE [DISTWIDTH] IN BLOOD BY AUTOMATED COUNT: 12.1 % (ref 11.6–15.1)
GFR SERPL CREATININE-BSD FRML MDRD: 69 ML/MIN/1.73SQ M
GLUCOSE P FAST SERPL-MCNC: 71 MG/DL (ref 65–99)
HCT VFR BLD AUTO: 48.1 % (ref 34.8–46.1)
HDLC SERPL-MCNC: 78 MG/DL
HGB BLD-MCNC: 16.2 G/DL (ref 11.5–15.4)
IMM GRANULOCYTES # BLD AUTO: 0.01 THOUSAND/UL (ref 0–0.2)
IMM GRANULOCYTES NFR BLD AUTO: 0 % (ref 0–2)
LDLC SERPL CALC-MCNC: 109 MG/DL (ref 0–100)
LYMPHOCYTES # BLD AUTO: 2.1 THOUSANDS/ÂΜL (ref 0.6–4.47)
LYMPHOCYTES NFR BLD AUTO: 37 % (ref 14–44)
MCH RBC QN AUTO: 29.7 PG (ref 26.8–34.3)
MCHC RBC AUTO-ENTMCNC: 33.7 G/DL (ref 31.4–37.4)
MCV RBC AUTO: 88 FL (ref 82–98)
MONOCYTES # BLD AUTO: 0.43 THOUSAND/ÂΜL (ref 0.17–1.22)
MONOCYTES NFR BLD AUTO: 8 % (ref 4–12)
NEUTROPHILS # BLD AUTO: 2.83 THOUSANDS/ÂΜL (ref 1.85–7.62)
NEUTS SEG NFR BLD AUTO: 49 % (ref 43–75)
NONHDLC SERPL-MCNC: 126 MG/DL
NRBC BLD AUTO-RTO: 0 /100 WBCS
PLATELET # BLD AUTO: 217 THOUSANDS/UL (ref 149–390)
PMV BLD AUTO: 10.6 FL (ref 8.9–12.7)
POTASSIUM SERPL-SCNC: 4.4 MMOL/L (ref 3.5–5.3)
PROT SERPL-MCNC: 6.7 G/DL (ref 6.4–8.4)
RBC # BLD AUTO: 5.46 MILLION/UL (ref 3.81–5.12)
SODIUM SERPL-SCNC: 142 MMOL/L (ref 135–147)
TRIGL SERPL-MCNC: 86 MG/DL
WBC # BLD AUTO: 5.69 THOUSAND/UL (ref 4.31–10.16)

## 2024-01-04 PROCEDURE — 80053 COMPREHEN METABOLIC PANEL: CPT

## 2024-01-04 PROCEDURE — 36415 COLL VENOUS BLD VENIPUNCTURE: CPT

## 2024-01-04 PROCEDURE — 80061 LIPID PANEL: CPT

## 2024-01-04 PROCEDURE — 85025 COMPLETE CBC W/AUTO DIFF WBC: CPT

## 2024-01-08 ENCOUNTER — OFFICE VISIT (OUTPATIENT)
Dept: INTERNAL MEDICINE CLINIC | Age: 64
End: 2024-01-08
Payer: MEDICARE

## 2024-01-08 VITALS
WEIGHT: 142 LBS | HEART RATE: 64 BPM | DIASTOLIC BLOOD PRESSURE: 62 MMHG | SYSTOLIC BLOOD PRESSURE: 112 MMHG | HEIGHT: 63 IN | TEMPERATURE: 98.2 F | OXYGEN SATURATION: 98 % | BODY MASS INDEX: 25.16 KG/M2

## 2024-01-08 DIAGNOSIS — J45.30 MILD PERSISTENT ASTHMA WITHOUT COMPLICATION: ICD-10-CM

## 2024-01-08 DIAGNOSIS — Z71.85 IMMUNIZATION COUNSELING: Primary | ICD-10-CM

## 2024-01-08 DIAGNOSIS — Z23 NEED FOR INFLUENZA VACCINATION: ICD-10-CM

## 2024-01-08 DIAGNOSIS — D58.2 ELEVATED HEMOGLOBIN (HCC): ICD-10-CM

## 2024-01-08 PROCEDURE — 90662 IIV NO PRSV INCREASED AG IM: CPT

## 2024-01-08 PROCEDURE — 90471 IMMUNIZATION ADMIN: CPT

## 2024-01-08 PROCEDURE — 99214 OFFICE O/P EST MOD 30 MIN: CPT | Performed by: INTERNAL MEDICINE

## 2024-01-08 NOTE — PROGRESS NOTES
Assessment/Plan:    Immunization counseling  Annual flu given today  Recommended PCV-20 and shingrix    Mild persistent asthma  Cont advair daily  Albuterol prn  Avoid triggers  Discussed PCV-20  F/u in 6 months    Elevated hemoglobin (HCC)  Increase fluids  Will check iron panel, repeat cbc  F/u in 6 months       Diagnoses and all orders for this visit:    Immunization counseling    Mild persistent asthma without complication    Elevated hemoglobin (HCC)  -     CBC and differential; Future  -     Iron Panel (Includes Ferritin, Iron Sat%, Iron, and TIBC); Future  -     TSH, 3rd generation with Free T4 reflex; Future  -     Comprehensive metabolic panel; Future    Need for influenza vaccination  -     influenza vaccine, high-dose, PF 0.7 mL (FLUZONE HIGH-DOSE)               Subjective:          Patient ID: Ling East is a 63 y.o. female.    Doing well.  Asthma controlled.  Using albuterol 2-3 times per week when it is cold.  Used prior to shoveling and gets relief.  She is using advair daily.  Got her flu shot this year. Had PPSV 23 in 2016.    Elevated Hgb:  She does not drink a lot of water.  Since yesterday she only had 1 bottle of water.  She does not smoke.  No family hx of iron issues.  She has had issues with anemia in the past.        The following portions of the patient's history were reviewed and updated as appropriate: allergies, current medications, past family history, past medical history, past social history, past surgical history, and problem list.    Review of Systems   Constitutional:  Negative for chills, fatigue and fever.   HENT:  Negative for congestion and sore throat.    Eyes:  Negative for visual disturbance.   Respiratory:  Negative for cough and shortness of breath.    Cardiovascular:  Negative for chest pain, palpitations and leg swelling.   Gastrointestinal:  Negative for abdominal pain, constipation, diarrhea, nausea and vomiting.   Neurological:  Negative for dizziness and  headaches.   Psychiatric/Behavioral:  Negative for dysphoric mood. The patient is not nervous/anxious.          Past Medical History:   Diagnosis Date    Asthma     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 03/14/2022    COVID-19 10/21/2022    Stroke ([facility]) 2001         Current Outpatient Medications:     albuterol (2.5 mg/3 mL) 0.083 % nebulizer solution, Take 2.5 mg by nebulization every 6 (six) hours as needed for wheezing, Disp: , Rfl:     albuterol (PROVENTIL HFA,VENTOLIN HFA) 90 mcg/act inhaler, Inhale 2 puffs 4 (four) times a day, Disp: 8.5 g, Rfl: 2    Ascorbic Acid (VITAMIN C GUMMIE PO), Take 1 tablet by mouth daily, Disp: , Rfl:     cholecalciferol (VITAMIN D3) 1,000 units tablet, Take 1,000 Units by mouth daily, Disp: , Rfl:     Coenzyme Q10 (COQ10) 100 MG CAPS, Take 1 capsule by mouth daily, Disp: , Rfl:     ELDERBERRY PO, Take 1 capsule by mouth daily , Disp: , Rfl:     fexofenadine (ALLEGRA) 180 MG tablet, Take 1 tablet (180 mg total) by mouth daily, Disp: 90 tablet, Rfl: 0    fluticasone (FLONASE) 50 mcg/act nasal spray, 2 sprays into each nostril daily, Disp: 18.2 mL, Rfl: 5    Fluticasone-Salmeterol (Advair) 250-50 mcg/dose inhaler, Inhale 1 puff 2 (two) times a day Rinse mouth after use., Disp: 60 blister, Rfl: 0    Vitamin Mixture (VITAMIN E COMPLETE PO), Take by mouth 267 mg  one pill per day, Disp: , Rfl:     Allergies   Allergen Reactions    Mayonnaise Hives    Mold Extract [Trichophyton] Other (See Comments)     Asthma      Other Eye Swelling     Dogs, cats, leaves - pt has asthma    Dust Mite Extract Cough     asthma       Social History   History reviewed. No pertinent surgical history.  Family History   Problem Relation Age of Onset    Cancer Father     Cancer Brother     No Known Problems Mother     No Known Problems Sister     No Known Problems Maternal Grandmother     No Known Problems Maternal Grandfather     No Known Problems Paternal Grandmother     No Known Problems Paternal  "Grandfather     No Known Problems Sister     No Known Problems Son     No Known Problems Maternal Aunt     No Known Problems Paternal Aunt        Health Maintenance   Topic Date Due    Medicare Annual Wellness Visit (AWV)  Never done    COVID-19 Vaccine (1) Never done    HIV Screening  Never done    Osteoporosis Screening  Never done    Pneumococcal Vaccine: 65+ Years (2 - PCV) 04/18/2017    Breast Cancer Screening: Mammogram  11/02/2022    Colorectal Cancer Screening  07/06/2024    Fall Risk  07/31/2024    Urinary Incontinence Screening  07/31/2024    Falls: Plan of Care  07/31/2024    Depression Screening  12/06/2024    Hepatitis C Screening  Completed    Influenza Vaccine  Completed    HIB Vaccine  Aged Out    IPV Vaccine  Aged Out    Hepatitis A Vaccine  Aged Out    Meningococcal ACWY Vaccine  Aged Out    HPV Vaccine  Aged Out     Immunization History   Administered Date(s) Administered    INFLUENZA 08/19/2014, 08/12/2017, 12/06/2018, 11/04/2021, 11/04/2021    Influenza, high dose seasonal 0.7 mL 01/08/2024    Influenza, injectable, quadrivalent, preservative free 0.5 mL 09/30/2019    Pneumococcal Polysaccharide PPV23 04/18/2016    Tdap 04/18/2016         Objective:  /62 (BP Location: Left arm, Patient Position: Sitting, Cuff Size: Standard)   Pulse 64   Temp 98.2 °F (36.8 °C) (Temporal)   Ht 5' 3\" (1.6 m)   Wt 64.4 kg (142 lb)   SpO2 98%   BMI 25.15 kg/m²   Body mass index is 25.15 kg/m².     Physical Exam  Constitutional:       Appearance: She is well-developed.   HENT:      Head: Normocephalic and atraumatic.      Right Ear: Tympanic membrane, ear canal and external ear normal.      Left Ear: Tympanic membrane, ear canal and external ear normal.      Nose: Nose normal.   Eyes:      Conjunctiva/sclera: Conjunctivae normal.      Pupils: Pupils are equal, round, and reactive to light.   Cardiovascular:      Rate and Rhythm: Normal rate and regular rhythm.      Heart sounds: Normal heart sounds. "   Pulmonary:      Effort: Pulmonary effort is normal.      Breath sounds: Normal breath sounds.   Musculoskeletal:      Cervical back: Normal range of motion and neck supple.   Skin:     General: Skin is warm and dry.   Neurological:      Mental Status: She is alert.   Psychiatric:         Mood and Affect: Mood normal.

## 2024-01-24 DIAGNOSIS — J45.30 MILD PERSISTENT ASTHMA WITHOUT COMPLICATION: ICD-10-CM

## 2024-01-24 RX ORDER — FLUTICASONE PROPIONATE AND SALMETEROL 250; 50 UG/1; UG/1
1 POWDER RESPIRATORY (INHALATION) 2 TIMES DAILY
Qty: 60 BLISTER | Refills: 0 | Status: SHIPPED | OUTPATIENT
Start: 2024-01-24

## 2024-02-21 PROBLEM — Z12.11 SCREENING FOR COLON CANCER: Status: RESOLVED | Noted: 2019-09-30 | Resolved: 2024-02-21

## 2024-04-02 DIAGNOSIS — J45.30 MILD PERSISTENT ASTHMA WITHOUT COMPLICATION: ICD-10-CM

## 2024-04-02 RX ORDER — FLUTICASONE PROPIONATE AND SALMETEROL 250; 50 UG/1; UG/1
1 POWDER RESPIRATORY (INHALATION) 2 TIMES DAILY
Qty: 60 BLISTER | Refills: 0 | Status: SHIPPED | OUTPATIENT
Start: 2024-04-02

## 2024-04-17 DIAGNOSIS — Z91.09 ENVIRONMENTAL ALLERGIES: ICD-10-CM

## 2024-04-17 RX ORDER — FLUTICASONE PROPIONATE 50 MCG
SPRAY, SUSPENSION (ML) NASAL
Qty: 16 ML | Refills: 5 | OUTPATIENT
Start: 2024-04-17

## 2024-04-17 RX ORDER — FEXOFENADINE HCL 180 MG/1
180 TABLET ORAL DAILY
Qty: 90 TABLET | Refills: 0 | Status: SHIPPED | OUTPATIENT
Start: 2024-04-17

## 2024-06-13 DIAGNOSIS — J45.30 MILD PERSISTENT ASTHMA WITHOUT COMPLICATION: ICD-10-CM

## 2024-06-13 NOTE — TELEPHONE ENCOUNTER
Reason for call:   [x] Refill   [] Prior Auth  [] Other:     Office:   [x] PCP/Provider -   [] Specialty/Provider -     Medication:     Fluticasone-Salmeterol (Advair) 250-50 mcg/dose inhaler       Dose/Frequency: Inhale 1 puff 2 (two) times a day     Quantity:  60 blister     Pharmacy: [facility]/pharmacy #[id number] - [place] - [address] [phone]     Does the patient have enough for 3 days?   [] Yes   [x] No - Send as HP to POD

## 2024-06-14 RX ORDER — FLUTICASONE PROPIONATE AND SALMETEROL 250; 50 UG/1; UG/1
1 POWDER RESPIRATORY (INHALATION) 2 TIMES DAILY
Qty: 60 BLISTER | Refills: 5 | Status: SHIPPED | OUTPATIENT
Start: 2024-06-14

## 2024-07-09 ENCOUNTER — OFFICE VISIT (OUTPATIENT)
Dept: INTERNAL MEDICINE CLINIC | Age: 64
End: 2024-07-09

## 2024-07-09 VITALS
TEMPERATURE: 97.6 F | BODY MASS INDEX: 23.14 KG/M2 | SYSTOLIC BLOOD PRESSURE: 150 MMHG | WEIGHT: 130.6 LBS | OXYGEN SATURATION: 98 % | HEART RATE: 74 BPM | DIASTOLIC BLOOD PRESSURE: 98 MMHG | HEIGHT: 63 IN

## 2024-07-09 DIAGNOSIS — J06.9 UPPER RESPIRATORY TRACT INFECTION, UNSPECIFIED TYPE: Primary | ICD-10-CM

## 2024-07-09 DIAGNOSIS — Z12.31 ENCOUNTER FOR SCREENING MAMMOGRAM FOR MALIGNANT NEOPLASM OF BREAST: ICD-10-CM

## 2024-07-09 DIAGNOSIS — Z12.11 ENCOUNTER FOR SCREENING FOR MALIGNANT NEOPLASM OF COLON: ICD-10-CM

## 2024-07-09 RX ORDER — GUAIFENESIN 200 MG/10ML
200 LIQUID ORAL 3 TIMES DAILY PRN
Qty: 120 ML | Refills: 0 | Status: SHIPPED | OUTPATIENT
Start: 2024-07-09

## 2024-07-09 RX ORDER — AZITHROMYCIN 250 MG/1
TABLET, FILM COATED ORAL
Qty: 6 TABLET | Refills: 0 | Status: SHIPPED | OUTPATIENT
Start: 2024-07-09 | End: 2024-07-13

## 2024-07-09 NOTE — PROGRESS NOTES
[facility]  INTERNAL MEDICINE        NAME: Ling East  AGE: 64 y.o. SEX: female    DATE OF ENCOUNTER: 7/9/2024    ASSESSMENT AND PLAN     1. Encounter for screening mammogram for malignant neoplasm of breast  - Mammo screening bilateral w 3d & cad; Future    2. Encounter for screening for malignant neoplasm of colon  - Ambulatory Referral to Gastroenterology; Future    3. Upper respiratory tract infection, unspecified type  Presents with complaints of productive cough with green mucus, headache since last Monday.  Denies any fever, chest pain, shortness of breath, abdominal pain, nausea, vomiting.  No recent travel.   was in the hospital for 3 days for cancer treatment 2 weeks ago and she was with him.  History of allergy for which she takes daily Zyrtec and Flonase.  Also has a history of asthma on Advair.  Reports having pets at home including birds, dogs.  Given duration of the symptoms and sputum production, will prescribe azithromycin and Robitussin for cough.  Patient instructed to call the clinic or go to the ED if symptoms worsen or persist.  She understands and agrees.  - azithromycin (ZITHROMAX) 250 mg tablet; Take 2 tablets today then 1 tablet daily x 4 days  Dispense: 6 tablet; Refill: 0  - guaiFENesin (ROBITUSSIN) 100 MG/5ML oral liquid; Take 10 mL (200 mg total) by mouth 3 (three) times a day as needed for cough  Dispense: 120 mL; Refill: 0    No orders of the defined types were placed in this encounter.          CHIEF COMPLAINT     Chief Complaint   Patient presents with    Cold Like Symptoms     [id number]     health maintenance]     Patient aware to schedule gastro consult & mammo       HISTORY OF PRESENT ILLNESS     64-year-old female with past medical history of asthma, hypertension, allergy who presents today with complaints of productive cough (green sputum), headache, congestion without any fever, chills, chest pain, shortness of breath, abdominal pain, nausea,  vomiting for past week or so.  She reports she has pets at home.  Her  was recently hospitalized for 3 days for cancer treatment.  No recent travels.    The following portions of the patient's history were reviewed and updated as appropriate: allergies, current medications, past family history, past medical history, past social history, past surgical history and problem list.    REVIEW OF SYSTEMS     Review of Systems   Constitutional: Negative for fever.   HENT:  Negative for ear pain and sore throat.    Eyes:  Negative for blurred vision and pain.   Cardiovascular:  Negative for chest pain and dyspnea on exertion.   Respiratory:  Positive for cough and sputum production. Negative for shortness of breath.    Gastrointestinal:  Negative for abdominal pain, nausea and vomiting.   Neurological:  Positive for headaches.   All other systems reviewed and are negative.        All other ROS negative except per HPI    ACTIVE PROBLEM LIST     Patient Active Problem List   Diagnosis    Mild persistent asthma    Bradycardia    Essential hypertension    Immunization counseling    Right ankle sprain    Positive colorectal cancer screening using Cologuard test    Tick bites    Poison ivy dermatitis    Environmental allergies    Menopause    Elevated hemoglobin (HCC)       Past Medical History:   Diagnosis Date    Asthma     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 03/14/2022    COVID-19 10/21/2022    Stroke (HCC) 2001       CURRENT MEDICATIONS       Current Outpatient Medications:     albuterol (PROVENTIL HFA,VENTOLIN HFA) 90 mcg/act inhaler, Inhale 2 puffs 4 (four) times a day, Disp: 8.5 g, Rfl: 2    Ascorbic Acid (VITAMIN C GUMMIE PO), Take 1 tablet by mouth daily, Disp: , Rfl:     cholecalciferol (VITAMIN D3) 1,000 units tablet, Take 1,000 Units by mouth daily, Disp: , Rfl:     Coenzyme Q10 (COQ10) 100 MG CAPS, Take 1 capsule by mouth daily, Disp: , Rfl:     ELDERBERRY PO, Take 1 capsule by mouth daily , Disp: , Rfl:  "    fexofenadine (ALLEGRA) 180 MG tablet, Take 1 tablet (180 mg total) by mouth daily, Disp: 90 tablet, Rfl: 0    fluticasone (FLONASE) 50 mcg/act nasal spray, 2 sprays into each nostril daily, Disp: 18.2 mL, Rfl: 5    Fluticasone-Salmeterol (Advair) 250-50 mcg/dose inhaler, Inhale 1 puff 2 (two) times a day Rinse mouth after use., Disp: 60 blister, Rfl: 5    Vitamin Mixture (VITAMIN E COMPLETE PO), Take by mouth 267 mg  one pill per day, Disp: , Rfl:     albuterol (2.5 mg/3 mL) 0.083 % nebulizer solution, Take 2.5 mg by nebulization every 6 (six) hours as needed for wheezing (Patient not taking: Reported on 7/9/2024), Disp: , Rfl:     Allergies   Allergen Reactions    Mayonnaise Hives    Mold Extract [Trichophyton] Other (See Comments)     Asthma      Other Eye Swelling     Dogs, cats, leaves - pt has asthma    Dust Mite Extract Cough     asthma       Social History   History reviewed. No pertinent surgical history.  Family History   Problem Relation Age of Onset    Cancer Father     Cancer Brother     No Known Problems Mother     No Known Problems Sister     No Known Problems Maternal Grandmother     No Known Problems Maternal Grandfather     No Known Problems Paternal Grandmother     No Known Problems Paternal Grandfather     No Known Problems Sister     No Known Problems Son     No Known Problems Maternal Aunt     No Known Problems Paternal Aunt        OBJECTIVE     /98 (BP Location: Left arm, Patient Position: Sitting, Cuff Size: Adult)   Pulse 74   Temp 97.6 °F (36.4 °C) (Temporal)   Ht 5' 3\" (1.6 m)   Wt 59.2 kg (130 lb 9.6 oz)   SpO2 98% Comment: ra  BMI 23.13 kg/m²     Physical Exam  Vitals reviewed.           Pertinent Laboratory/Diagnostic Studies:     Mammo screening bilateral w 3d & cad    Result Date: 11/4/2021  Impression: No mammographic evidence of malignancy. ASSESSMENT/BI-RADS CATEGORY: Left: 2 - Benign Right: 2 - Benign Overall: 2 - Benign RECOMMENDATION:      - Routine screening " mammogram in 1 year for both breasts. Workstation ID: [id number]       Images and diagnostics reviewed     HEALTH MAINTENANCE     Health Maintenance   Topic Date Due    Medicare Annual Wellness Visit (AWV)  Never done    Osteoporosis Screening  Never done    Zoster Vaccine (1 of 2) Never done    Pneumococcal Vaccine: 65+ Years (2 of 2 - PCV) 04/18/2017    RSV Vaccine Age 60+ Years (1 - 1-dose 60+ series) Never done    Breast Cancer Screening: Mammogram  11/02/2022    COVID-19 Vaccine (4 - 2023-24 season) 09/01/2023    Colorectal Cancer Screening  07/06/2024    Fall Risk  07/31/2024    Influenza Vaccine (1) 09/01/2024    Depression Screening  12/06/2024    Urinary Incontinence Screening  07/31/2024    Falls: Plan of Care  07/31/2024    Hepatitis C Screening  Completed    RSV Vaccine age 0-20 Months  Aged Out    HIB Vaccine  Aged Out    IPV Vaccine  Aged Out    Hepatitis A Vaccine  Aged Out    Meningococcal ACWY Vaccine  Aged Out    HPV Vaccine  Aged Out     Immunization History   Administered Date(s) Administered    COVID-19 PFIZER VACCINE 0.3 ML IM 04/08/2021, 04/30/2021, 01/12/2022    INFLUENZA 08/19/2014, 08/12/2017, 12/06/2018, 11/04/2021, 11/04/2021    Influenza, high dose seasonal 0.7 mL 01/08/2024    Influenza, injectable, quadrivalent, preservative free 0.5 mL 09/30/2019    Pneumococcal Polysaccharide PPV23 04/18/2016    Tdap 04/18/2016       I globally spent 35 minutes face-to-face with the patient and with chart review.     Ron Bolanos, DO  Internal Medicine PGY-3

## 2024-07-18 DIAGNOSIS — Z91.09 ENVIRONMENTAL ALLERGIES: ICD-10-CM

## 2024-07-18 RX ORDER — FEXOFENADINE HCL 180 MG/1
180 TABLET ORAL DAILY
Qty: 90 TABLET | Refills: 1 | Status: SHIPPED | OUTPATIENT
Start: 2024-07-18

## 2024-07-18 NOTE — TELEPHONE ENCOUNTER
Medication: fexofenadine (ALLEGRA) 180 MG tablet     Dose/Frequency: Take 1 tablet (180 mg total) by mouth daily,     Quantity: 90    Pharmacy: [facility]/pharm[facility] #[id number] - [place] - [address]     Office:   [x] PCP/Provider - Nneka blanca, patient of Nadia Resendiz  [] Speciality/Provider -     Does the patient have enough for 3 days?   [x] Yes   [] No - Send as HP to POD

## 2024-08-07 ENCOUNTER — OFFICE VISIT (OUTPATIENT)
Dept: INTERNAL MEDICINE CLINIC | Facility: OTHER | Age: 64
End: 2024-08-07
Payer: MEDICARE

## 2024-08-07 VITALS
BODY MASS INDEX: 23.39 KG/M2 | HEART RATE: 59 BPM | WEIGHT: 132 LBS | TEMPERATURE: 97.8 F | DIASTOLIC BLOOD PRESSURE: 80 MMHG | HEIGHT: 63 IN | OXYGEN SATURATION: 98 % | SYSTOLIC BLOOD PRESSURE: 126 MMHG

## 2024-08-07 DIAGNOSIS — E55.9 VITAMIN D DEFICIENCY: ICD-10-CM

## 2024-08-07 DIAGNOSIS — Z13.1 SCREENING FOR DIABETES MELLITUS (DM): ICD-10-CM

## 2024-08-07 DIAGNOSIS — J45.30 MILD PERSISTENT ASTHMA WITHOUT COMPLICATION: ICD-10-CM

## 2024-08-07 DIAGNOSIS — I10 ESSENTIAL HYPERTENSION: ICD-10-CM

## 2024-08-07 DIAGNOSIS — D58.2 ELEVATED HEMOGLOBIN (HCC): ICD-10-CM

## 2024-08-07 DIAGNOSIS — Z86.73 HISTORY OF CVA (CEREBROVASCULAR ACCIDENT): ICD-10-CM

## 2024-08-07 DIAGNOSIS — Z00.00 WELCOME TO MEDICARE PREVENTIVE VISIT: Primary | ICD-10-CM

## 2024-08-07 DIAGNOSIS — Z86.010 HISTORY OF COLON POLYPS: ICD-10-CM

## 2024-08-07 DIAGNOSIS — E78.5 HYPERLIPIDEMIA, UNSPECIFIED HYPERLIPIDEMIA TYPE: ICD-10-CM

## 2024-08-07 DIAGNOSIS — R00.1 BRADYCARDIA: ICD-10-CM

## 2024-08-07 DIAGNOSIS — Z78.0 MENOPAUSE: ICD-10-CM

## 2024-08-07 PROCEDURE — G0403 EKG FOR INITIAL PREVENT EXAM: HCPCS

## 2024-08-07 PROCEDURE — 99214 OFFICE O/P EST MOD 30 MIN: CPT

## 2024-08-07 PROCEDURE — G0402 INITIAL PREVENTIVE EXAM: HCPCS

## 2024-08-07 NOTE — ASSESSMENT & PLAN NOTE
Discussed DEXA, patient declined  Encouraged 1200 mg calcium daily and vitamin D 1000 IU daily  Continue weightbearing exercises  Vitamin D level

## 2024-08-07 NOTE — ASSESSMENT & PLAN NOTE
History of CVA in 2001  Patient not currently on statin, will get updated lipid panel  She is no longer taking aspirin  BP under good control, however has had history of high blood pressure in the past.  See plan under hypertension

## 2024-08-07 NOTE — PROGRESS NOTES
Ambulatory Visit  Name: Ling East      : 1960      MRN: [id number]  Encounter Provider: Nneka Hills PA-C  Encounter Date: 2024   Encounter department: [facility] [place]    Assessment & Plan   1. Welcome to Medicare preventive visit  Comments:  Vision screening and EKG reviewed.  Questionnaire reviewed with patient  Orders:  -     POCT ECG  2. History of colon polyps  Comments:  History of positive Cologuard and colon colon polyps.  Due for colonoscopy   Orders:  -     Ambulatory Referral to Gastroenterology; Future  3. Elevated hemoglobin (HCC)  Assessment & Plan:  Noted 2024, will advised increased fluids and repeat CBC  Orders:  -     CBC and differential; Future  4. Essential hypertension  Assessment & Plan:  Blood pressure well-controlled today at 126/80  Has a history of previously high blood pressure, patient not interested in medication  Continue healthy diet and exercise habits  Recommend home BP monitoring  Follow-up 6 months or sooner if blood pressure persistently elevated  5. Bradycardia  Assessment & Plan:  History of bradycardia, asymptomatic  EKG: Sinus bradycardia, history of septal infarct.  Rate 52  Will check TSH  Orders:  -     TSH, 3rd generation with Free T4 reflex; Future  6. Hyperlipidemia, unspecified hyperlipidemia type  Assessment & Plan:  History of hyperlipidemia noted in chart, patient not currently on cholesterol-lowering medication  Will get updated labs, patient encouraged to go within next 2-3 weeks for the blood work  Orders:  -     Lipid Panel with Direct LDL reflex; Future  7. Vitamin D deficiency  -     Vitamin D 25 hydroxy; Future  8. History of CVA (cerebrovascular accident)  Assessment & Plan:  History of CVA in   Patient not currently on statin, will get updated lipid panel  She is no longer taking aspirin  BP under good control, however has had history of high blood pressure in the past.  See plan under  hypertension  Orders:  -     Comprehensive metabolic panel; Future  9. Screening for diabetes mellitus (DM)  -     Hemoglobin A1C; Future  10. Mild persistent asthma without complication  Assessment & Plan:  Controlled with Advair twice daily, albuterol as needed  Avoid triggers  Patient declined PCV 20, will again discuss at next visit  Follow-up 6 months  11. Menopause  Assessment & Plan:  Discussed DEXA, patient declined  Encouraged 1200 mg calcium daily and vitamin D 1000 IU daily  Continue weightbearing exercises  Vitamin D level      Depression Screening and Follow-up Plan: Patient was screened for depression during today's encounter. They screened negative with a PHQ-2 score of 0.    Preventive health issues were discussed with patient, and age appropriate screening tests were ordered as noted in patient's After Visit Summary. Personalized health advice and appropriate referrals for health education or preventive services given if needed, as noted in patient's After Visit Summary.    History of Present Illness     Patient is a 64-year-old female presenting to the office for 6-month follow-up and Medicare wellness visit  She has no acute concerns today  She did not have lab work completed prior to today's visit      EKG: sinus bradycardia, T wave inversion in leads aVR, V1, V2 suggestive of septal infarct age undetermined.  There are no previous EKGs for comparison       Patient Care Team:  Nadia Resendiz PA-C as PCP - General (Family Medicine)    Review of Systems   Constitutional:  Negative for chills, fatigue and fever.   HENT:  Negative for congestion, ear pain, postnasal drip, rhinorrhea, sinus pressure, sore throat and trouble swallowing.    Eyes:  Negative for pain and visual disturbance.   Respiratory:  Negative for cough, shortness of breath and wheezing.    Cardiovascular:  Negative for chest pain, palpitations and leg swelling.   Gastrointestinal:  Negative for abdominal pain, nausea and vomiting.    Genitourinary:  Negative for difficulty urinating, dysuria and hematuria.   Musculoskeletal:  Negative for arthralgias and back pain.   Skin:  Negative for color change, rash and wound.   Neurological:  Negative for dizziness, weakness, light-headedness, numbness and headaches.   Psychiatric/Behavioral:  Negative for dysphoric mood and sleep disturbance. The patient is not nervous/anxious.    All other systems reviewed and are negative.    Medical History Reviewed by provider this encounter:  Tobacco  Allergies  Meds  Problems  Med Hx  Surg Hx  Fam Hx       Annual Wellness Visit Questionnaire   Ling is here for her Welcome to Medicare visit.     Health Risk Assessment:   Patient rates overall health as very good. Patient feels that their physical health rating is same. Patient is satisfied with their life. Eyesight was rated as same. Hearing was rated as same. Patient feels that their emotional and mental health rating is same. Patients states they are never, rarely angry. Patient states they are never, rarely unusually tired/fatigued. Pain experienced in the last 7 days has been none. Patient states that she has experienced no weight loss or gain in last 6 months.     Depression Screening:   PHQ-2 Score: 0      Fall Risk Screening:   In the past year, patient has experienced: no history of falling in past year      Urinary Incontinence Screening:   Patient has not leaked urine accidently in the last six months.     Home Safety:  Patient does not have trouble with stairs inside or outside of their home. Patient has working smoke alarms and has working carbon monoxide detector. Home safety hazards include: none.     Nutrition:   Current diet is Regular.     Medications:   Patient is currently taking over-the-counter supplements. OTC medications include: see medication list. Patient is able to manage medications.     Activities of Daily Living (ADLs)/Instrumental Activities of Daily Living (IADLs):   Walk  and transfer into and out of bed and chair?: Yes  Dress and groom yourself?: Yes    Bathe or shower yourself?: Yes    Feed yourself? Yes  Do your laundry/housekeeping?: Yes  Manage your money, pay your bills and track your expenses?: Yes  Make your own meals?: Yes    Do your own shopping?: Yes    Previous Hospitalizations:   Any hospitalizations or ED visits within the last 12 months?: Yes    How many hospitalizations have you had in the last year?: 1-2    Advance Care Planning:   Living will: No    Durable POA for healthcare: Yes    Advanced directive: No    ACP document given: Yes      Cognitive Screening:   Provider or family/friend/caregiver concerned regarding cognition?: No    PREVENTIVE SCREENINGS      Cardiovascular Screening:    General: Screening Current    Due for: Lipid Panel      Diabetes Screening:     General: Screening Current      Colorectal Cancer Screening:     General: Screening Current      Breast Cancer Screening:     General: Risks and Benefits Discussed    Due for: Mammogram        Cervical Cancer Screening:    General: Screening Not Indicated      Osteoporosis Screening:    General: Risks and Benefits Discussed and Patient Declines      Abdominal Aortic Aneurysm (AAA) Screening:        General: Screening Not Indicated      Lung Cancer Screening:     General: Screening Not Indicated      Hepatitis C Screening:    General: Screening Current    Screening, Brief Intervention, and Referral to Treatment (SBIRT)    Screening  Typical number of drinks in a day: 0  Typical number of drinks in a week: 0  Interpretation: Low risk drinking behavior.    Single Item Drug Screening:  How often have you used an illegal drug (including marijuana) or a prescription medication for non-medical reasons in the past year? never    Single Item Drug Screen Score: 0  Interpretation: Negative screen for possible drug use disorder    Brief Intervention  Alcohol & drug use screenings were reviewed. No concerns regarding  "substance use disorder identified.     Other Counseling Topics:   Car/seat belt/driving safety, skin self-exam, sunscreen and calcium and vitamin D intake and regular weightbearing exercise.     Social Determinants of Health     Food Insecurity: No Food Insecurity (8/7/2024)    Hunger Vital Sign    • Worried About Running Out of Food in the Last Year: Never true    • Ran Out of Food in the Last Year: Never true   Transportation Needs: No Transportation Needs (8/7/2024)    PRAPARE - Transportation    • Lack of Transportation (Medical): No    • Lack of Transportation (Non-Medical): No   Housing Stability: Low Risk  (8/7/2024)    Housing Stability Vital Sign    • Unable to Pay for Housing in the Last Year: No    • Number of Times Moved in the Last Year: 0    • Homeless in the Last Year: No   Utilities: Not At Risk (8/7/2024)    [facility] Utilities    • Threatened with loss of utilities: No     Vision Screening    Right eye Left eye Both eyes   Without correction 20 25 20 25 20 20   With correction          Objective     /80 (BP Location: Left arm, Patient Position: Sitting, Cuff Size: Adult)   Pulse 59   Temp 97.8 °F (36.6 °C) (Temporal)   Ht 5' 3\" (1.6 m)   Wt 59.9 kg (132 lb)   SpO2 98%   BMI 23.38 kg/m²     Physical Exam  Vitals and nursing note reviewed.   Constitutional:       General: She is not in acute distress.     Appearance: Normal appearance. She is not ill-appearing.   HENT:      Head: Normocephalic and atraumatic.      Right Ear: External ear normal.      Left Ear: External ear normal.      Nose: Nose normal.      Mouth/Throat:      Mouth: Mucous membranes are moist.      Pharynx: Oropharynx is clear.   Eyes:      General: No scleral icterus.     Conjunctiva/sclera: Conjunctivae normal.      Pupils: Pupils are equal, round, and reactive to light.   Cardiovascular:      Rate and Rhythm: Normal rate and regular rhythm.      Heart sounds: Normal heart sounds. No murmur heard.     No friction rub. No " gallop.   Pulmonary:      Effort: Pulmonary effort is normal. No respiratory distress.      Breath sounds: Normal breath sounds. No wheezing, rhonchi or rales.   Chest:      Chest wall: No tenderness.   Musculoskeletal:      Right lower leg: No edema.      Left lower leg: No edema.   Skin:     General: Skin is warm and dry.      Coloration: Skin is not pale.      Findings: No erythema, lesion or rash.   Neurological:      General: No focal deficit present.      Mental Status: She is alert and oriented to person, place, and time. Mental status is at baseline.   Psychiatric:         Mood and Affect: Mood normal.         Behavior: Behavior normal.     Administrative Statements

## 2024-08-07 NOTE — ASSESSMENT & PLAN NOTE
History of hyperlipidemia noted in chart, patient not currently on cholesterol-lowering medication  Will get updated labs, patient encouraged to go within next 2-3 weeks for the blood work

## 2024-08-07 NOTE — ASSESSMENT & PLAN NOTE
History of bradycardia, asymptomatic  EKG: Sinus bradycardia, history of septal infarct.  Rate 52  Will check TSH

## 2024-08-07 NOTE — ASSESSMENT & PLAN NOTE
Blood pressure well-controlled today at 126/80  Has a history of previously high blood pressure, patient not interested in medication  Continue healthy diet and exercise habits  Recommend home BP monitoring  Follow-up 6 months or sooner if blood pressure persistently elevated

## 2024-08-07 NOTE — ASSESSMENT & PLAN NOTE
Controlled with Advair twice daily, albuterol as needed  Avoid triggers  Patient declined PCV 20, will again discuss at next visit  Follow-up 6 months

## 2024-08-29 ENCOUNTER — TELEPHONE (OUTPATIENT)
Dept: OTHER | Facility: HOSPITAL | Age: 64
End: 2024-08-29

## 2024-08-29 NOTE — TELEPHONE ENCOUNTER
Patient called requesting refill for allegra. Patient made aware medication was refilled on 7/18/2024 for 90 with 1 refills to [facility] pharmacy. Patient instructed to contact the pharmacy to obtain refills of medication. Patient verbalized understanding.

## 2024-09-23 ENCOUNTER — OFFICE VISIT (OUTPATIENT)
Dept: INTERNAL MEDICINE CLINIC | Facility: OTHER | Age: 64
End: 2024-09-23
Payer: MEDICARE

## 2024-09-23 VITALS
SYSTOLIC BLOOD PRESSURE: 114 MMHG | HEART RATE: 75 BPM | WEIGHT: 132 LBS | DIASTOLIC BLOOD PRESSURE: 62 MMHG | OXYGEN SATURATION: 98 % | TEMPERATURE: 97.8 F | BODY MASS INDEX: 23.38 KG/M2

## 2024-09-23 DIAGNOSIS — J45.909 UNCOMPLICATED ASTHMA, UNSPECIFIED ASTHMA SEVERITY, UNSPECIFIED WHETHER PERSISTENT: ICD-10-CM

## 2024-09-23 DIAGNOSIS — Z71.85 IMMUNIZATION COUNSELING: ICD-10-CM

## 2024-09-23 DIAGNOSIS — I10 ESSENTIAL HYPERTENSION: ICD-10-CM

## 2024-09-23 DIAGNOSIS — J45.21 MILD INTERMITTENT ASTHMA WITH EXACERBATION: ICD-10-CM

## 2024-09-23 DIAGNOSIS — Z91.09 ENVIRONMENTAL ALLERGIES: ICD-10-CM

## 2024-09-23 DIAGNOSIS — J45.30 MILD PERSISTENT ASTHMA WITHOUT COMPLICATION: Primary | ICD-10-CM

## 2024-09-23 PROCEDURE — 99213 OFFICE O/P EST LOW 20 MIN: CPT

## 2024-09-23 PROCEDURE — G2211 COMPLEX E/M VISIT ADD ON: HCPCS

## 2024-09-23 RX ORDER — PREDNISONE 20 MG/1
40 TABLET ORAL DAILY
Qty: 10 TABLET | Refills: 0 | Status: SHIPPED | OUTPATIENT
Start: 2024-09-23 | End: 2024-09-28

## 2024-09-23 RX ORDER — FEXOFENADINE HCL 180 MG/1
180 TABLET ORAL DAILY
Qty: 90 TABLET | Refills: 1 | Status: CANCELLED | OUTPATIENT
Start: 2024-09-23

## 2024-09-23 RX ORDER — ALBUTEROL SULFATE 90 UG/1
2 INHALANT RESPIRATORY (INHALATION) EVERY 6 HOURS PRN
Qty: 8.5 G | Refills: 2 | Status: SHIPPED | OUTPATIENT
Start: 2024-09-23

## 2024-09-23 RX ORDER — AZITHROMYCIN 250 MG/1
TABLET, FILM COATED ORAL
Qty: 6 TABLET | Refills: 0 | Status: SHIPPED | OUTPATIENT
Start: 2024-09-23 | End: 2024-09-27

## 2024-09-23 RX ORDER — MONTELUKAST SODIUM 10 MG/1
10 TABLET ORAL
Qty: 90 TABLET | Refills: 0 | Status: SHIPPED | OUTPATIENT
Start: 2024-09-23

## 2024-09-23 NOTE — PROGRESS NOTES
Assessment/Plan:    1. Mild persistent asthma without complication  Assessment & Plan:  Acute exacerbation today, likely triggered by environmental allergies  Will order prednisone 40 mg x 5 days  Will also order Z-Ralf due to congestion, rhinorrhea, postnasal drip, productive cough  Will start Singulair 10 mg daily  Albuterol refilled to be used every 6 hours as needed shortness of breath or wheezing  Continue Flonase daily  Follow-up in office in 1 week or sooner if symptoms worsen.  Proceed to ER with any shortness of breath, difficulty breathing  2. Environmental allergies  Assessment & Plan:  Minimal improvement with Allegra/Zyrtec  Will trial Singulair 10 mg daily, continue Flonase  Call if symptoms worsen  Orders:  -     montelukast (SINGULAIR) 10 mg tablet; Take 1 tablet (10 mg total) by mouth daily at bedtime  3. Mild intermittent asthma with exacerbation  -     predniSONE 20 mg tablet; Take 2 tablets (40 mg total) by mouth daily for 5 days  -     azithromycin (ZITHROMAX) 250 mg tablet; Take 2 tablets today then 1 tablet daily x 4 days  4. Uncomplicated asthma, unspecified asthma severity, unspecified whether persistent  -     albuterol (PROVENTIL HFA,VENTOLIN HFA) 90 mcg/act inhaler; Inhale 2 puffs every 6 (six) hours as needed for wheezing or shortness of breath  5. Immunization counseling  Assessment & Plan:  Requesting flu shot.  Will hold off until current exacerbation of asthma resolved  6. Essential hypertension  Assessment & Plan:  Blood pressure well-controlled today   Has a history of previously high blood pressure, patient not interested in medication  Continue healthy diet and exercise habits  Recommend home BP monitoring  Follow-up 6 months or sooner if blood pressure persistently elevated            [employer] software was used to dictate this note.  It may contain errors with dictating incorrect words or incorrect spelling. Please contact the provider directly with any questions.    Subjective:       Patient ID: Ling East is a 64 y.o. female.    Patient is a 64-year-old female presenting to the office for URI symptoms x 1 week  Patient notes she was in the [facility] picking cornstalks and throwing hay/straw lydia.  She was around variety of molds while picking corn  Patient is endorsing productive cough, congestion, rhinorrhea, postnasal drip  She also endorses occasional shortness of breath and wheezing.  She does use her albuterol inhaler as needed      Tx tired: Zyrtec, Benadryl     URI   This is a new problem. The current episode started in the past 7 days. The problem has been unchanged. Associated symptoms include congestion, coughing (occasional), rhinorrhea and wheezing (occasional with exertion). Pertinent negatives include no abdominal pain, chest pain, diarrhea, ear pain, headaches, nausea, plugged ear sensation, sinus pain, sore throat or vomiting. She has tried antihistamine for the symptoms. The treatment provided mild relief.         The following portions of the patient's history were reviewed and updated as appropriate: allergies, current medications, past family history, past medical history, past social history, past surgical history, and problem list.    Review of Systems   Constitutional:  Negative for chills, fatigue and fever.   HENT:  Positive for congestion, postnasal drip and rhinorrhea. Negative for ear pain, sinus pressure, sinus pain, sore throat and trouble swallowing.    Eyes:  Positive for redness. Negative for pain, discharge and itching.   Respiratory:  Positive for cough (occasional) and wheezing (occasional with exertion). Negative for chest tightness and shortness of breath.    Cardiovascular:  Negative for chest pain and palpitations.   Gastrointestinal:  Negative for abdominal pain, constipation, diarrhea, nausea and vomiting.   Neurological:  Negative for dizziness, light-headedness and headaches.         Past Medical History:   Diagnosis Date    Asthma      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 03/14/2022    COVID-19 10/21/2022    Stroke ([facility]) 2001         Current Outpatient Medications:     albuterol (PROVENTIL HFA,VENTOLIN HFA) 90 mcg/act inhaler, Inhale 2 puffs every 6 (six) hours as needed for wheezing or shortness of breath, Disp: 8.5 g, Rfl: 2    Ascorbic Acid (VITAMIN C GUMMIE PO), Take 1 tablet by mouth daily, Disp: , Rfl:     azithromycin (ZITHROMAX) 250 mg tablet, Take 2 tablets today then 1 tablet daily x 4 days, Disp: 6 tablet, Rfl: 0    cholecalciferol (VITAMIN D3) 1,000 units tablet, Take 1,000 Units by mouth daily, Disp: , Rfl:     Coenzyme Q10 (COQ10) 100 MG CAPS, Take 1 capsule by mouth daily, Disp: , Rfl:     ELDERBERRY PO, Take 1 capsule by mouth daily , Disp: , Rfl:     fluticasone (FLONASE) 50 mcg/act nasal spray, 2 sprays into each nostril daily, Disp: 18.2 mL, Rfl: 5    Fluticasone-Salmeterol (Advair) 250-50 mcg/dose inhaler, Inhale 1 puff 2 (two) times a day Rinse mouth after use., Disp: 60 blister, Rfl: 5    montelukast (SINGULAIR) 10 mg tablet, Take 1 tablet (10 mg total) by mouth daily at bedtime, Disp: 90 tablet, Rfl: 0    predniSONE 20 mg tablet, Take 2 tablets (40 mg total) by mouth daily for 5 days, Disp: 10 tablet, Rfl: 0    Vitamin Mixture (VITAMIN E COMPLETE PO), Take by mouth 267 mg  one pill per day, Disp: , Rfl:     Allergies   Allergen Reactions    Playa Del Reynnaise Hives    Mold Extract [Trichophyton] Other (See Comments)     Asthma      Other Eye Swelling     Dogs, cats, leaves - pt has asthma    Dust Mite Extract Cough     asthma       Social History     Objective:  /62 (BP Location: Left arm, Patient Position: Sitting, Cuff Size: Standard)   Pulse 75   Temp 97.8 °F (36.6 °C) (Temporal)   Wt 59.9 kg (132 lb)   SpO2 98%   BMI 23.38 kg/m²      Physical Exam  Vitals and nursing note reviewed.   Constitutional:       General: She is not in acute distress.     Appearance: Normal appearance. She is not ill-appearing.   HENT:       Head: Normocephalic and atraumatic.      Right Ear: Tympanic membrane, ear canal and external ear normal.      Left Ear: Tympanic membrane, ear canal and external ear normal.      Nose: Congestion present.      Mouth/Throat:      Mouth: Mucous membranes are moist.      Pharynx: Oropharynx is clear. No posterior oropharyngeal erythema.   Eyes:      General: No scleral icterus.     Conjunctiva/sclera: Conjunctivae normal.   Cardiovascular:      Rate and Rhythm: Normal rate and regular rhythm.      Heart sounds: Normal heart sounds. No murmur heard.     No friction rub. No gallop.   Pulmonary:      Effort: Pulmonary effort is normal. No respiratory distress.      Breath sounds: Examination of the right-lower field reveals wheezing and rhonchi. Examination of the left-lower field reveals rhonchi. Wheezing and rhonchi present. No rales.   Chest:      Chest wall: No tenderness.   Musculoskeletal:      Cervical back: Normal range of motion. No tenderness.      Right lower leg: No edema.      Left lower leg: No edema.   Lymphadenopathy:      Cervical: No cervical adenopathy.   Skin:     General: Skin is warm and dry.   Neurological:      General: No focal deficit present.      Mental Status: She is alert and oriented to person, place, and time. Mental status is at baseline.   Psychiatric:         Mood and Affect: Mood normal.         Behavior: Behavior normal.           Disclaimer: This note was generated with voice recognition software.  Phonetic, grammatical, and spelling errors may be present as a result.  Please contact provider with any concerns or questions

## 2024-09-23 NOTE — ASSESSMENT & PLAN NOTE
Acute exacerbation today, likely triggered by environmental allergies  Will order prednisone 40 mg x 5 days  Will also order Z-Ralf due to congestion, rhinorrhea, postnasal drip, productive cough  Will start Singulair 10 mg daily  Albuterol refilled to be used every 6 hours as needed shortness of breath or wheezing  Continue Flonase daily  Follow-up in office in 1 week or sooner if symptoms worsen.  Proceed to ER with any shortness of breath, difficulty breathing

## 2024-09-23 NOTE — ASSESSMENT & PLAN NOTE
Minimal improvement with Allegra/Zyrtec  Will trial Singulair 10 mg daily, continue Flonase  Call if symptoms worsen

## 2024-09-23 NOTE — ASSESSMENT & PLAN NOTE
Blood pressure well-controlled today   Has a history of previously high blood pressure, patient not interested in medication  Continue healthy diet and exercise habits  Recommend home BP monitoring  Follow-up 6 months or sooner if blood pressure persistently elevated

## 2024-10-08 ENCOUNTER — OFFICE VISIT (OUTPATIENT)
Dept: INTERNAL MEDICINE CLINIC | Facility: OTHER | Age: 64
End: 2024-10-08
Payer: MEDICARE

## 2024-10-08 ENCOUNTER — APPOINTMENT (OUTPATIENT)
Dept: LAB | Age: 64
End: 2024-10-08
Payer: MEDICARE

## 2024-10-08 VITALS
SYSTOLIC BLOOD PRESSURE: 132 MMHG | TEMPERATURE: 97.9 F | WEIGHT: 134.6 LBS | HEART RATE: 76 BPM | OXYGEN SATURATION: 97 % | HEIGHT: 63 IN | BODY MASS INDEX: 23.85 KG/M2 | DIASTOLIC BLOOD PRESSURE: 86 MMHG

## 2024-10-08 DIAGNOSIS — R00.1 BRADYCARDIA: ICD-10-CM

## 2024-10-08 DIAGNOSIS — Z13.1 SCREENING FOR DIABETES MELLITUS (DM): ICD-10-CM

## 2024-10-08 DIAGNOSIS — E55.9 VITAMIN D DEFICIENCY: ICD-10-CM

## 2024-10-08 DIAGNOSIS — E78.5 HYPERLIPIDEMIA, UNSPECIFIED HYPERLIPIDEMIA TYPE: ICD-10-CM

## 2024-10-08 DIAGNOSIS — D58.2 ELEVATED HEMOGLOBIN (HCC): ICD-10-CM

## 2024-10-08 DIAGNOSIS — S40.022A HEMATOMA OF ARM, LEFT, INITIAL ENCOUNTER: Primary | ICD-10-CM

## 2024-10-08 DIAGNOSIS — I10 ESSENTIAL HYPERTENSION: ICD-10-CM

## 2024-10-08 DIAGNOSIS — Z86.73 HISTORY OF CVA (CEREBROVASCULAR ACCIDENT): ICD-10-CM

## 2024-10-08 LAB
25(OH)D3 SERPL-MCNC: 87.5 NG/ML (ref 30–100)
ALBUMIN SERPL BCG-MCNC: 4 G/DL (ref 3.5–5)
ALP SERPL-CCNC: 73 U/L (ref 34–104)
ALT SERPL W P-5'-P-CCNC: 30 U/L (ref 7–52)
ANION GAP SERPL CALCULATED.3IONS-SCNC: 10 MMOL/L (ref 4–13)
AST SERPL W P-5'-P-CCNC: 30 U/L (ref 13–39)
BASOPHILS # BLD AUTO: 0.03 THOUSANDS/ΜL (ref 0–0.1)
BASOPHILS NFR BLD AUTO: 0 % (ref 0–1)
BILIRUB SERPL-MCNC: 0.61 MG/DL (ref 0.2–1)
BUN SERPL-MCNC: 15 MG/DL (ref 5–25)
CALCIUM SERPL-MCNC: 9.2 MG/DL (ref 8.4–10.2)
CHLORIDE SERPL-SCNC: 104 MMOL/L (ref 96–108)
CHOLEST SERPL-MCNC: 185 MG/DL
CO2 SERPL-SCNC: 26 MMOL/L (ref 21–32)
CREAT SERPL-MCNC: 0.75 MG/DL (ref 0.6–1.3)
EOSINOPHIL # BLD AUTO: 0.25 THOUSAND/ΜL (ref 0–0.61)
EOSINOPHIL NFR BLD AUTO: 4 % (ref 0–6)
ERYTHROCYTE [DISTWIDTH] IN BLOOD BY AUTOMATED COUNT: 12.4 % (ref 11.6–15.1)
EST. AVERAGE GLUCOSE BLD GHB EST-MCNC: 108 MG/DL
GFR SERPL CREATININE-BSD FRML MDRD: 83 ML/MIN/1.73SQ M
GLUCOSE P FAST SERPL-MCNC: 94 MG/DL (ref 65–99)
HBA1C MFR BLD: 5.4 %
HCT VFR BLD AUTO: 46.3 % (ref 34.8–46.1)
HDLC SERPL-MCNC: 77 MG/DL
HGB BLD-MCNC: 15.4 G/DL (ref 11.5–15.4)
IMM GRANULOCYTES # BLD AUTO: 0.02 THOUSAND/UL (ref 0–0.2)
IMM GRANULOCYTES NFR BLD AUTO: 0 % (ref 0–2)
LDLC SERPL CALC-MCNC: 95 MG/DL (ref 0–100)
LYMPHOCYTES # BLD AUTO: 1.74 THOUSANDS/ΜL (ref 0.6–4.47)
LYMPHOCYTES NFR BLD AUTO: 26 % (ref 14–44)
MCH RBC QN AUTO: 29.9 PG (ref 26.8–34.3)
MCHC RBC AUTO-ENTMCNC: 33.3 G/DL (ref 31.4–37.4)
MCV RBC AUTO: 90 FL (ref 82–98)
MONOCYTES # BLD AUTO: 0.41 THOUSAND/ΜL (ref 0.17–1.22)
MONOCYTES NFR BLD AUTO: 6 % (ref 4–12)
NEUTROPHILS # BLD AUTO: 4.22 THOUSANDS/ΜL (ref 1.85–7.62)
NEUTS SEG NFR BLD AUTO: 64 % (ref 43–75)
NRBC BLD AUTO-RTO: 0 /100 WBCS
PLATELET # BLD AUTO: 242 THOUSANDS/UL (ref 149–390)
PMV BLD AUTO: 10.8 FL (ref 8.9–12.7)
POTASSIUM SERPL-SCNC: 4.2 MMOL/L (ref 3.5–5.3)
PROT SERPL-MCNC: 6.6 G/DL (ref 6.4–8.4)
RBC # BLD AUTO: 5.15 MILLION/UL (ref 3.81–5.12)
SODIUM SERPL-SCNC: 140 MMOL/L (ref 135–147)
TRIGL SERPL-MCNC: 65 MG/DL
TSH SERPL DL<=0.05 MIU/L-ACNC: 0.97 UIU/ML (ref 0.45–4.5)
WBC # BLD AUTO: 6.67 THOUSAND/UL (ref 4.31–10.16)

## 2024-10-08 PROCEDURE — 82306 VITAMIN D 25 HYDROXY: CPT

## 2024-10-08 PROCEDURE — 84443 ASSAY THYROID STIM HORMONE: CPT

## 2024-10-08 PROCEDURE — 80061 LIPID PANEL: CPT

## 2024-10-08 PROCEDURE — 83036 HEMOGLOBIN GLYCOSYLATED A1C: CPT

## 2024-10-08 PROCEDURE — 80053 COMPREHEN METABOLIC PANEL: CPT

## 2024-10-08 PROCEDURE — 99213 OFFICE O/P EST LOW 20 MIN: CPT

## 2024-10-08 PROCEDURE — 85025 COMPLETE CBC W/AUTO DIFF WBC: CPT

## 2024-10-08 PROCEDURE — G2211 COMPLEX E/M VISIT ADD ON: HCPCS

## 2024-10-08 PROCEDURE — 36415 COLL VENOUS BLD VENIPUNCTURE: CPT

## 2024-10-08 NOTE — PROGRESS NOTES
"Ambulatory Visit  Name: Ling East      : 1960      MRN: [id number]  Encounter Provider: Nneka Hills PA-C  Encounter Date: 10/8/2024   Encounter department: [facility]    Assessment & Plan  Hematoma of arm, left, initial encounter  Hematoma suspected from obtaining blood work this morning  Examined with Dr. Millard  Recommend warm compresses to area.  Patient may take Tylenol/ibuprofen as needed for pain  Discussed red flag symptoms including progression of swelling up the upper extremity which patient needs to call the office for immediately  Will defer imaging at this time, unless symptoms worsen or fail to improve         Essential hypertension  Fair control.-Multiple conversations with patient discussing risks of elevated blood pressure.  Patient refusing medication at this time  Recommend low-salt diet, healthy exercise habits and monitoring BP at home.  Contact office for consistent elevations            History of Present Illness     Sore  Sweollen over left arm, medial  This was the side of the stroke    No blood thinning medications               Review of Systems  Medical History Reviewed by provider this encounter:  Tobacco  Allergies  Meds  Problems  Med Hx  Surg Hx  Fam Hx           Objective     /86 (BP Location: Right arm, Patient Position: Sitting, Cuff Size: Standard)   Pulse 76   Temp 97.9 °F (36.6 °C) (Temporal)   Ht 5' 3\" (1.6 m)   Wt 61.1 kg (134 lb 9.6 oz)   SpO2 97% Comment: ra  BMI 23.84 kg/m²     Physical Exam  Vitals and nursing note reviewed.   Constitutional:       General: She is not in acute distress.     Appearance: Normal appearance. She is not ill-appearing.   HENT:      Head: Normocephalic and atraumatic.      Right Ear: External ear normal.      Left Ear: External ear normal.      Nose: Nose normal.      Mouth/Throat:      Mouth: Mucous membranes are moist.      Pharynx: Oropharynx is clear.   Eyes:      " General: No scleral icterus.     Conjunctiva/sclera: Conjunctivae normal.      Pupils: Pupils are equal, round, and reactive to light.   Cardiovascular:      Rate and Rhythm: Normal rate and regular rhythm.      Pulses:           Radial pulses are 2+ on the right side and 2+ on the left side.      Heart sounds: Normal heart sounds. No murmur heard.     No friction rub. No gallop.   Pulmonary:      Effort: Pulmonary effort is normal. No respiratory distress.      Breath sounds: Normal breath sounds. No wheezing, rhonchi or rales.   Chest:      Chest wall: No tenderness.   Musculoskeletal:      Right lower leg: No edema.      Left lower leg: No edema.      Comments: Hematoma of the medial L elbow  TTP  Neurovascularly intact to left arm   Skin:     General: Skin is warm and dry.      Coloration: Skin is not pale.      Findings: No erythema.   Neurological:      General: No focal deficit present.      Mental Status: She is alert and oriented to person, place, and time. Mental status is at baseline.   Psychiatric:         Mood and Affect: Mood normal.         Behavior: Behavior normal.       Administrative Statements     Disclaimer: This note was generated with voice recognition software.  Phonetic, grammatical, and spelling errors may be present as a result.  Please contact provider with any concerns or questions

## 2024-10-08 NOTE — ASSESSMENT & PLAN NOTE
Fair control.-Multiple conversations with patient discussing risks of elevated blood pressure.  Patient refusing medication at this time  Recommend low-salt diet, healthy exercise habits and monitoring BP at home.  Contact office for consistent elevations

## 2024-12-07 DIAGNOSIS — J45.909 UNCOMPLICATED ASTHMA, UNSPECIFIED ASTHMA SEVERITY, UNSPECIFIED WHETHER PERSISTENT: ICD-10-CM

## 2024-12-09 RX ORDER — ALBUTEROL SULFATE 90 UG/1
INHALANT RESPIRATORY (INHALATION)
Qty: 8.5 G | Refills: 5 | Status: SHIPPED | OUTPATIENT
Start: 2024-12-09

## 2024-12-19 DIAGNOSIS — Z91.09 ENVIRONMENTAL ALLERGIES: ICD-10-CM

## 2024-12-19 NOTE — TELEPHONE ENCOUNTER
Reason for call:   [x] Refill   [] Prior Auth  [] Other:     Office:   [x] PCP/Provider -   [] Specialty/Provider -     Medication:  fluticasone (FLONASE) 50 mcg/act nasal spray    Dose/Frequency:  2 sprays into each nostril daily     Quantity: 18.2    Pharmacy: [facility] #[id number] - [place] - [address] [phone]    Does the patient have enough for 3 days?   [x] Yes   [] No - Send as HP to POD

## 2024-12-20 RX ORDER — MONTELUKAST SODIUM 10 MG/1
10 TABLET ORAL
Qty: 90 TABLET | Refills: 1 | Status: SHIPPED | OUTPATIENT
Start: 2024-12-20

## 2024-12-20 RX ORDER — FLUTICASONE PROPIONATE 50 MCG
2 SPRAY, SUSPENSION (ML) NASAL DAILY
Qty: 18.2 ML | Refills: 5 | Status: SHIPPED | OUTPATIENT
Start: 2024-12-20

## 2025-01-20 DIAGNOSIS — Z91.09 ENVIRONMENTAL ALLERGIES: ICD-10-CM

## 2025-01-21 RX ORDER — FLUTICASONE PROPIONATE 50 MCG
SPRAY, SUSPENSION (ML) NASAL
Qty: 48 ML | Refills: 1 | Status: SHIPPED | OUTPATIENT
Start: 2025-01-21

## 2025-01-28 ENCOUNTER — RA CDI HCC (OUTPATIENT)
Dept: OTHER | Facility: HOSPITAL | Age: 65
End: 2025-01-28

## 2025-02-05 ENCOUNTER — OFFICE VISIT (OUTPATIENT)
Dept: INTERNAL MEDICINE CLINIC | Facility: OTHER | Age: 65
End: 2025-02-05
Payer: MEDICARE

## 2025-02-05 VITALS
BODY MASS INDEX: 24.8 KG/M2 | TEMPERATURE: 97.4 F | HEART RATE: 54 BPM | HEIGHT: 63 IN | SYSTOLIC BLOOD PRESSURE: 120 MMHG | DIASTOLIC BLOOD PRESSURE: 66 MMHG | OXYGEN SATURATION: 99 % | WEIGHT: 140 LBS

## 2025-02-05 DIAGNOSIS — J01.00 ACUTE NON-RECURRENT MAXILLARY SINUSITIS: ICD-10-CM

## 2025-02-05 DIAGNOSIS — Z86.73 HISTORY OF CVA (CEREBROVASCULAR ACCIDENT): ICD-10-CM

## 2025-02-05 DIAGNOSIS — E78.2 MIXED HYPERLIPIDEMIA: ICD-10-CM

## 2025-02-05 DIAGNOSIS — Z78.0 POST-MENOPAUSAL: ICD-10-CM

## 2025-02-05 DIAGNOSIS — Z86.0100 HX OF COLONIC POLYP: ICD-10-CM

## 2025-02-05 DIAGNOSIS — Z13.820 SCREENING FOR OSTEOPOROSIS: ICD-10-CM

## 2025-02-05 DIAGNOSIS — R19.5 POSITIVE COLORECTAL CANCER SCREENING USING COLOGUARD TEST: Primary | ICD-10-CM

## 2025-02-05 DIAGNOSIS — J45.30 MILD PERSISTENT ASTHMA WITHOUT COMPLICATION: ICD-10-CM

## 2025-02-05 DIAGNOSIS — D58.2 ELEVATED HEMOGLOBIN (HCC): ICD-10-CM

## 2025-02-05 DIAGNOSIS — I10 ESSENTIAL HYPERTENSION: ICD-10-CM

## 2025-02-05 DIAGNOSIS — R00.1 BRADYCARDIA: ICD-10-CM

## 2025-02-05 PROBLEM — S93.401A RIGHT ANKLE SPRAIN: Status: RESOLVED | Noted: 2020-09-21 | Resolved: 2025-02-05

## 2025-02-05 PROBLEM — L23.7 POISON IVY DERMATITIS: Status: RESOLVED | Noted: 2021-06-21 | Resolved: 2025-02-05

## 2025-02-05 PROCEDURE — 99214 OFFICE O/P EST MOD 30 MIN: CPT

## 2025-02-05 PROCEDURE — G2211 COMPLEX E/M VISIT ADD ON: HCPCS

## 2025-02-05 RX ORDER — AZITHROMYCIN 250 MG/1
TABLET, FILM COATED ORAL
Qty: 6 TABLET | Refills: 0 | Status: SHIPPED | OUTPATIENT
Start: 2025-02-05 | End: 2025-02-09

## 2025-02-05 NOTE — ASSESSMENT & PLAN NOTE
Lipid panel improving, held for LDL not at goal  Discussed low-dose statin with patient given history of CVA.  She declines at this time  Recommend red yeast rice, omega-3 1200 mg twice daily, healthy diet and exercise habits   Continue to trend lipid panel    Orders:    Lipid Panel with Direct LDL reflex; Future

## 2025-02-05 NOTE — ASSESSMENT & PLAN NOTE
At goal today  Patient currently off antihypertensives, continue to monitor  Recommend low-salt diet, healthy exercise habits and monitoring BP at home. Contact office for consistent elevations     Orders:    Comprehensive metabolic panel; Future    TSH, 3rd generation with Free T4 reflex; Future    CBC and differential; Future

## 2025-02-05 NOTE — PROGRESS NOTES
Name: Ling East      : 1960      MRN: [id number]  Encounter Provider: Nneka Hills PA-C  Encounter Date: 2025   Encounter department: [facility]  :  Assessment & Plan  Positive colorectal cancer screening using Cologuard test  Due for colonoscopy due to personal history of colon polyps  Referral to GI placed    Orders:  •  Ambulatory Referral to Gastroenterology; Future    Hx of colonic polyp    Orders:  •  Ambulatory Referral to Gastroenterology; Future    Elevated hemoglobin (HCC)  Last hemoglobin WNL  Continue to trend CBCs         Post-menopausal    Orders:  •  DXA bone density spine hip and pelvis; Future    Screening for osteoporosis    Orders:  •  DXA bone density spine hip and pelvis; Future    Mixed hyperlipidemia  Lipid panel improving, held for LDL not at goal  Discussed low-dose statin with patient given history of CVA.  She declines at this time  Recommend red yeast rice, omega-3 1200 mg twice daily, healthy diet and exercise habits   Continue to trend lipid panel    Orders:  •  Lipid Panel with Direct LDL reflex; Future    Essential hypertension  At goal today  Patient currently off antihypertensives, continue to monitor  Recommend low-salt diet, healthy exercise habits and monitoring BP at home. Contact office for consistent elevations     Orders:  •  Comprehensive metabolic panel; Future  •  TSH, 3rd generation with Free T4 reflex; Future  •  CBC and differential; Future    Bradycardia  Asymptomatic  Has a longstanding history of bradycardia  EKG completed 2024 showed no evidence of heart block  Continue to monitor, can consider referral to cardiology, especially if patient becomes symptomatic    Orders:  •  TSH, 3rd generation with Free T4 reflex; Future    Acute non-recurrent maxillary sinusitis  Will treat with azithromycin given that patient has already taken amoxicillin prior to presentation  Continue antihistamines, Flonase  Continue  current inhaler regiment.  Does not appear to be in acute exacerbation of asthma  Recommend saline nasal sprays, steam showers, humidifiers as supportive care  Continue Tylenol as needed for headaches  Follow-up in office if symptoms not improved  Orders:  •  azithromycin (ZITHROMAX) 250 mg tablet; Take 2 tablets today then 1 tablet daily x 4 days    Mild persistent asthma without complication  Controlled with Advair twice daily, Singulair daily  Uses albuterol as needed         History of CVA (cerebrovascular accident)  History of CVA in 2001  Patient not currently on statin  Discussed that while lipids currently improving, goal would be LDL less than 70  She is no longer taking aspirin  BP under good control, however has had history of high blood pressure in the past.  See plan under hypertension                History of Present Illness   Patient is a 64-year-old female presenting to the office for 6-month follow-up of chronic conditions  No labs completed prior to today's appointment  Labs from 10/8/2024 reviewed with patient    Patient does report that she had URI symtpoms x 1 month   Patient does report that at the onset of symptoms, she had leftover amoxicillin at home which she did take  She notes this did improve her symptoms, however they still linger  She reports continued congestion, post nasal drip, rhinorrhea, discolored sputum   She has been using her antihistamines, inhalers as needed  She denies wheezing or shortness of breath          Review of Systems   Constitutional:  Negative for chills, fatigue and fever.   HENT:  Positive for congestion, postnasal drip, rhinorrhea and sinus pressure. Negative for ear pain, sinus pain, sore throat and trouble swallowing.    Eyes:  Negative for pain and visual disturbance.   Respiratory:  Positive for cough. Negative for shortness of breath and wheezing.    Cardiovascular:  Negative for chest pain, palpitations and leg swelling.   Gastrointestinal:  Negative  "for abdominal pain, constipation, diarrhea, nausea and vomiting.   Genitourinary:  Negative for difficulty urinating, dysuria, frequency, hematuria and urgency.   Neurological:  Positive for headaches (intermittent). Negative for dizziness, weakness, light-headedness and numbness.   Psychiatric/Behavioral:  Negative for dysphoric mood and sleep disturbance. The patient is not nervous/anxious.    All other systems reviewed and are negative.      Objective   /66 (BP Location: Left arm, Patient Position: Sitting, Cuff Size: Standard)   Pulse (!) 54   Temp (!) 97.4 °F (36.3 °C) (Temporal)   Ht 5' 3\" (1.6 m)   Wt 63.5 kg (140 lb)   SpO2 99%   BMI 24.80 kg/m²      Physical Exam  Vitals and nursing note reviewed.   Constitutional:       General: She is not in acute distress.     Appearance: Normal appearance. She is not ill-appearing.   HENT:      Head: Normocephalic and atraumatic.      Right Ear: Ear canal and external ear normal. A middle ear effusion is present. Tympanic membrane is not erythematous.      Left Ear: Ear canal and external ear normal. A middle ear effusion is present. Tympanic membrane is not erythematous.      Nose:      Right Sinus: Maxillary sinus tenderness present. No frontal sinus tenderness.      Left Sinus: Maxillary sinus tenderness present. No frontal sinus tenderness.      Comments: Mild pressure     Mouth/Throat:      Mouth: Mucous membranes are moist.      Pharynx: Oropharynx is clear. Uvula midline. Postnasal drip present. No posterior oropharyngeal erythema.      Tonsils: No tonsillar exudate.   Eyes:      General: No scleral icterus.     Conjunctiva/sclera: Conjunctivae normal.      Pupils: Pupils are equal, round, and reactive to light.   Cardiovascular:      Rate and Rhythm: Normal rate and regular rhythm.      Heart sounds: Normal heart sounds. No murmur heard.     No friction rub. No gallop.   Pulmonary:      Effort: Pulmonary effort is normal. No respiratory distress.     "  Breath sounds: Normal breath sounds. No wheezing, rhonchi or rales.   Chest:      Chest wall: No tenderness.   Musculoskeletal:      Cervical back: No tenderness.      Right lower leg: No edema.      Left lower leg: No edema.   Lymphadenopathy:      Cervical: Cervical adenopathy present.   Skin:     General: Skin is warm and dry.      Coloration: Skin is not pale.      Findings: No erythema.   Neurological:      General: No focal deficit present.      Mental Status: She is alert and oriented to person, place, and time. Mental status is at baseline.      Motor: No weakness.   Psychiatric:         Mood and Affect: Mood normal.         Behavior: Behavior normal.         Disclaimer: This note was generated with voice recognition software.  Phonetic, grammatical, and spelling errors may be present as a result.  Please contact provider with any concerns or questions

## 2025-02-05 NOTE — ASSESSMENT & PLAN NOTE
Due for colonoscopy due to personal history of colon polyps  Referral to GI placed    Orders:    Ambulatory Referral to Gastroenterology; Future

## 2025-02-05 NOTE — ASSESSMENT & PLAN NOTE
Controlled with Advair twice daily, Singulair daily  Uses albuterol as needed          Provider will follow up and further assess.

## 2025-02-05 NOTE — ASSESSMENT & PLAN NOTE
History of CVA in 2001  Patient not currently on statin  Discussed that while lipids currently improving, goal would be LDL less than 70  She is no longer taking aspirin  BP under good control, however has had history of high blood pressure in the past.  See plan under hypertension

## 2025-02-05 NOTE — ASSESSMENT & PLAN NOTE
Asymptomatic  Has a longstanding history of bradycardia  EKG completed 8-7-2024 showed no evidence of heart block  Continue to monitor, can consider referral to cardiology, especially if patient becomes symptomatic    Orders:    TSH, 3rd generation with Free T4 reflex; Future

## 2025-05-21 DIAGNOSIS — J45.909 UNCOMPLICATED ASTHMA, UNSPECIFIED ASTHMA SEVERITY, UNSPECIFIED WHETHER PERSISTENT: ICD-10-CM

## 2025-05-21 RX ORDER — ALBUTEROL SULFATE 90 UG/1
INHALANT RESPIRATORY (INHALATION)
Qty: 25.5 G | Refills: 1 | Status: SHIPPED | OUTPATIENT
Start: 2025-05-21

## 2025-05-28 DIAGNOSIS — J45.30 MILD PERSISTENT ASTHMA WITHOUT COMPLICATION: ICD-10-CM

## 2025-05-29 RX ORDER — FLUTICASONE PROPIONATE AND SALMETEROL 250; 50 UG/1; UG/1
1 POWDER RESPIRATORY (INHALATION) 2 TIMES DAILY
Qty: 60 BLISTER | Refills: 5 | Status: SHIPPED | OUTPATIENT
Start: 2025-05-29

## 2025-06-24 DIAGNOSIS — Z91.09 ENVIRONMENTAL ALLERGIES: ICD-10-CM

## 2025-06-24 RX ORDER — MONTELUKAST SODIUM 10 MG/1
10 TABLET ORAL
Qty: 90 TABLET | Refills: 1 | Status: SHIPPED | OUTPATIENT
Start: 2025-06-24

## (undated) RX ORDER — NEOMYCIN SULFATE, POLYMYXIN B SULFATE AND DEXAMETHASONE 3.5; 10000; 1 MG/G; [USP'U]/G; MG/G: 1/4 OINTMENT OPHTHALMIC TWICE A DAY

## (undated) RX ORDER — NEOMYCIN SULFATE, POLYMYXIN B SULFATE AND DEXAMETHASONE 3.5; 10000; 1 MG/ML; [USP'U]/ML; MG/ML: 1 SUSPENSION OPHTHALMIC